# Patient Record
Sex: MALE | Race: WHITE | NOT HISPANIC OR LATINO | Employment: PART TIME | URBAN - METROPOLITAN AREA
[De-identification: names, ages, dates, MRNs, and addresses within clinical notes are randomized per-mention and may not be internally consistent; named-entity substitution may affect disease eponyms.]

---

## 2018-05-03 ENCOUNTER — OFFICE VISIT - HEALTHEAST (OUTPATIENT)
Dept: SURGERY | Facility: CLINIC | Age: 28
End: 2018-05-03

## 2018-05-03 DIAGNOSIS — K80.20 CALCULUS OF GALLBLADDER WITHOUT CHOLECYSTITIS WITHOUT OBSTRUCTION: ICD-10-CM

## 2018-05-03 DIAGNOSIS — E66.01 MORBID OBESITY WITH BMI OF 70 AND OVER, ADULT (H): ICD-10-CM

## 2018-05-03 RX ORDER — OMEGA-3-ACID ETHYL ESTERS 1 G/1
2000 CAPSULE, LIQUID FILLED ORAL DAILY
Status: SHIPPED | COMMUNITY
Start: 2017-09-18

## 2018-05-03 RX ORDER — HYDROCHLOROTHIAZIDE 25 MG/1
25 TABLET ORAL DAILY
Status: SHIPPED | COMMUNITY
Start: 2017-01-05

## 2018-05-03 ASSESSMENT — MIFFLIN-ST. JEOR: SCORE: 3470.1

## 2021-06-01 VITALS — HEIGHT: 72 IN | WEIGHT: 315 LBS | BODY MASS INDEX: 42.66 KG/M2

## 2021-06-17 NOTE — PROGRESS NOTES
HPI: Jason Cantrell is a 27 y.o. male referred to see me by Rika Valdivia MD for right upper quadrant pain.  He notes  a single episode of right upper quadrant pain with associated nausea, vomiting, and diarrhea.  He reports is the first time that this happened, beginning yesterday morning and lasting for approximately 5 hours.  He is been having diarrhea for the preceding 3 days.  He notes having had similar symptoms several years prior, although there was no pain associated with the nausea vomiting diarrhea at that time.  Due to the change in symptoms he presented to the emergency department for evaluation.  There he underwent both imaging and laboratory evaluation which were notable for Gall gallstones, but no evidence of acute cholecystitis.  By the time that his evaluation was complete the pain had subsided and he was feeling well, so he is discharged home with plan for follow-up with surgery.      His medical history is notable for super morbid obesity, BMI of 73.9.  Has not had previous intra-abdominal surgery.  Has had off-and-on nausea, vomiting, and diarrhea for the past several years, but notes the frequency is less than 1 time per year.    Allergies:Review of patient's allergies indicates no known allergies.    Past Medical History:   Diagnosis Date     Bipolar disorder      Hypertension      Morbid obesity        Past Surgical History:   Procedure Laterality Date     HERNIA REPAIR         CURRENT MEDS:    Current Outpatient Prescriptions:      amLODIPine (NORVASC) 5 MG tablet, Take 5 mg by mouth daily., Disp: , Rfl:      cholecalciferol, vitamin D3, (VITAMIN D3) 2,000 unit cap, Take by mouth., Disp: , Rfl:      DULoxetine (CYMBALTA) 60 MG capsule, Take 60 mg by mouth daily., Disp: , Rfl:      hydroCHLOROthiazide (HYDRODIURIL) 25 MG tablet, Take 25 mg by mouth daily., Disp: , Rfl:      MULTIVIT-MINERALS/FERROUS GLUC (CEROVITE ORAL), Take by mouth., Disp: , Rfl:      niacin 500 MG tablet, Take 500  mg by mouth daily with breakfast., Disp: , Rfl:      omega-3 acid ethyl esters (LOVAZA) 1 gram capsule, Take 2,000 mg by mouth daily., Disp: , Rfl:      ziprasidone (GEODON) 80 MG capsule, Take 160 mg by mouth once daily., Disp: , Rfl:     Family History   Problem Relation Age of Onset     Diabetes Mother      Diverticulitis Father      Alcohol abuse Brother         reports that he has been smoking.  He has never used smokeless tobacco. He reports that he drinks alcohol. He reports that he does not use illicit drugs.    Review of Systems:  The 10 point review of systems  is within normal limits except for as mentioned above in the HPI.  General ROS: No complaints or constitutional symptoms  Skin: No complaints or symptoms   Hematologic/Lymphatic: No symptoms or complaints  Psychiatric: No symptoms or complaints  Endocrine: No excessive fatigue, no hypermetabolic symptoms reported  Respiratory ROS: no cough, shortness of breath, or wheezing  Cardiovascular ROS: no chest pain or dyspnea on exertion  Gastrointestinal ROS: As per HPI  Musculoskeletal ROS: no recent injuries reported  Neurological ROS: no focal neurologic defects reported.        BP (!) 171/93 (Patient Site: Right Arm, Patient Position: Sitting, Cuff Size: Adult Large)  Pulse 89  Ht 6' (1.829 m)  Wt (!) 545 lb (247.2 kg)  SpO2 94%  BMI 73.92 kg/m2  Body mass index is 73.92 kg/(m^2).    EXAM:  General : Alert, cooperative, appears stated age   Skin: Skin color, texture, turgor normal, no rashes or lesions   Lymphatic: No obvious adenopathy, no swelling   Eyes: No scleral icterus, pupils equal  HENT: no traumatic injury to the head or face, no gross abnormalities  Lungs: Normal respiratory effort, breath sounds equal bilaterally  Heart: Regular rate and rhythm  Abdomen: Soft, morbidly obese, nontender  Musculoskeletal: No obvious swelling,  Neurologic: Grossly intact      LABS:  Lab Results   Component Value Date    WBC 12.2 (H) 05/02/2018    HGB 14.9  05/02/2018    HCT 43.8 05/02/2018    MCV 83 05/02/2018     05/02/2018     INR/Prothrombin Time    Results from last 7 days  Lab Units 05/02/18  1002   LN-SODIUM mmol/L 139   LN-POTASSIUM mmol/L 3.9   LN-CHLORIDE mmol/L 102   LN-CO2 mmol/L 26   LN-BLOOD UREA NITROGEN mg/dL 14   LN-CREATININE mg/dL 0.80   LN-CALCIUM mg/dL 9.2     Lab Results   Component Value Date    ALT 40 05/02/2018    AST 29 05/02/2018    ALKPHOS 96 05/02/2018    BILITOT 0.4 05/02/2018       IMAGES:   Relevant images were reviewed and discussed with the patient.  Notable findings were the presence of a gallstone near the neck of the gallbladder with a fatty appearing liver.  No overt evidence of cholecystitis, though imaging is suboptimal given the patient's body habitus.    Assessment/Plan:   1. Morbid obesity with BMI of 70 and over, adult    2. Calculus of gallbladder without cholecystitis without obstruction        Jason Cnatrell is a 27 y.o. male with signs and symptoms consistent with a single episode of biliary colic.  I explained to them the formation of gallstones, and how this pain progressed to intermittent obstruction of the gallbladder causing pain.  Typically for a single episode that was classic, such as his, we would recommend surgery for gallbladder removal.  However, with his extreme morbid obesity, I think the potential risks of surgery specifically in his body habitus might outweigh the benefit he would gain from undergoing surgery in the short-term.  I explained how his morbid obesity, body habitus, put in a higher risk for complications such as iatrogenic injury to surrounding structures, as well as anesthetic complication.  As this was his single and first episode, I think would be reasonable to get him plugged in with our bariatric program in an attempt to lose weight prior to proceeding with any surgical intervention.  Discussed the difference in symptoms between biliary colic and acute cholecystitis and that  should acute cholecystitis present, he would then merit emergent surgery.    We briefly discussed his underlying morbid obesity, contributing factors to it, and medical as well as surgical options available for improvement of morbid obesity.  He is interested in losing weight and getting healthier.    Referral was made to the [facility] bariatric program.  We will otherwise defer on surgical intervention for the biliary colic at this time.  Approximately 40 minutes spent in the care of this patient, the majority of which was spent counseling regarding weight loss and tobacco cessation.      Ayo Zavala MD  [phone]  [facility] Department of Surgery

## 2022-12-05 ENCOUNTER — HOSPITAL ENCOUNTER (EMERGENCY)
Facility: HOSPITAL | Age: 32
Discharge: HOME | End: 2022-12-05
Admitting: PHYSICIAN ASSISTANT
Payer: COMMERCIAL

## 2022-12-05 VITALS
RESPIRATION RATE: 12 BRPM | OXYGEN SATURATION: 98 % | HEIGHT: 73 IN | WEIGHT: 315 LBS | BODY MASS INDEX: 41.75 KG/M2 | HEART RATE: 76 BPM | TEMPERATURE: 97.4 F

## 2022-12-05 DIAGNOSIS — M54.9 MUSCULOSKELETAL BACK PAIN: ICD-10-CM

## 2022-12-05 PROCEDURE — 99283 EMERGENCY DEPT VISIT LOW MDM: CPT

## 2022-12-05 RX ORDER — NAPROXEN 250 MG/1
500 TABLET ORAL ONCE
Status: DISCONTINUED | OUTPATIENT
Start: 2022-12-05 | End: 2022-12-05 | Stop reason: HOSPADM

## 2022-12-05 RX ORDER — CYCLOBENZAPRINE HCL 10 MG
10 TABLET ORAL 3 TIMES DAILY PRN
Qty: 18 TABLET | Refills: 0 | Status: SHIPPED | OUTPATIENT
Start: 2022-12-05 | End: 2022-12-11

## 2022-12-05 RX ORDER — CYCLOBENZAPRINE HCL 10 MG
10 TABLET ORAL ONCE
Status: DISCONTINUED | OUTPATIENT
Start: 2022-12-05 | End: 2022-12-05 | Stop reason: HOSPADM

## 2022-12-05 ASSESSMENT — ENCOUNTER SYMPTOMS
ABDOMINAL PAIN: 0
HEMATURIA: 0
BACK PAIN: 1
DIARRHEA: 0
CHILLS: 0
VOMITING: 0
DYSURIA: 0
NAUSEA: 0
SHORTNESS OF BREATH: 0
FEVER: 0
NUMBNESS: 0

## 2022-12-05 NOTE — ED NOTES
"ED Triage Provider Note  [facility] EMERGENCY DEPARTMENT  Encounter Date: Dec 5, 2022    History:  Chief Complaint   Patient presents with     Back Pain     Jason Cantrell is a 32 year old male who presents to the ED with back pain. Woke up with low back pain yesterday, rates it as an 4/10. Tried heating pad which made it better, worse with ambulation. Has not yet taken anything for pain. No fevers, vomiting, chest pain.      Review of Systems:  Positive for low back pain    Exam:  Pulse 76   Temp 97.4  F (36.3  C) (Tympanic)   Resp 12   Ht 1.854 m (6' 1\")   Wt (!) 247.2 kg (545 lb)   SpO2 98%   BMI 71.90 kg/m    General: No acute distress. Appears stated age.   Cardio: normal rate, extremities well perfused  Resp: Normal work of breathing, grossly normal respiratory rate  Neuro: Alert. Facial movement grossly symmetric. Grossly intact strength.       Medical Decision Making:  Patient arriving to the ED with problem as above. A medical screening exam was performed. Initial differential diagnosis includes but not limited to low back strain/sprain, less likely fracture or dislocation.    Imaging, meds orders initiated from Triage. The patient is most appropriate to return to the waiting room.       ANDREA MAE MD  12/5/2022 at 2:33 PM     Andrea Mae MD  12/05/22 1439    "

## 2022-12-05 NOTE — Clinical Note
Jason Cantrell was seen and treated in our emergency department on 12/5/2022.  He may return to work on 12/07/2022.       If you have any questions or concerns, please don't hesitate to call.      Timo Daley PA-C

## 2022-12-05 NOTE — ED PROVIDER NOTES
EMERGENCY DEPARTMENT ENCOUNTER      NAME: Jason Cantrell  AGE: 32 year old male  YOB: 1990  MRN: [id number]  EVALUATION DATE & TIME: No admission date for patient encounter.    PCP: [facility]    ED PROVIDER: Timo Daley PA-C      Chief Complaint   Patient presents with     Back Pain     FINAL IMPRESSION:  1. Musculoskeletal back pain        ED COURSE & MEDICAL DECISION MAKING:    Pertinent Labs & Imaging studies reviewed. (See chart for details)  4:42 PM I met the patient and performed my initial exam. PPE (gloves, glasses, N95 mask) was worn during patient encounters. I discussed a plan for discharge with the patient, and patient is agreeable. We discussed supportive cares at home and reasons for return to the ER, including new or worsening symptoms - all questions and concerns addressed. Discharged patient in stable condition and discussed plan for follow up with PCP.        32 year old male presents to the Emergency Department for evaluation of low back pain.     ED Course as of 12/05/22 1841   Mon Dec 05, 2022   1840 Patient is a 32-year-old male, no significant past medical history, who presents emergency department for evaluation of low back pain.  Patient initially stated that his back pain started yesterday, was approximately an 8 out of 10.  He took some naproxen, however 5 out of 10 after using a heating pad.  Denies any dysuria or hematuria dysuria.  No changes in bowel or bladder.  No saddle anesthesia.  No sign of spinal abscess, fevers, or red flag signs for cauda equina such as saddle anesthesia.  Patient is significantly obese, I think this is likely contributing to his back pain.  At the time my examination he is not complaining of any pain.  X-rays have initially been ordered, however given that his pain is now almost completely resolved without any medications, I think is highly unlikely that he has any fracture or other abnormality.  We will  plan for symptomatic management at home with ibuprofen, Tylenol, as well as muscle relaxers.  Patient is agreeable with this plan.  He has good strength and sensation to upper and lower extremities bilaterally, no other acute abnormalities.  No chest pain or shortness of breath.  No indication for further labs or imaging at this time.  Plan for discharge.       Medical Decision Making    History:    Supplemental history from: N/A    External Record(s) reviewed: Documented in HPI, if applicable.    Work Up:    Chart documentation includes differential considered and any EKGs or imaging interpreted by provider.    In additional to work up documented, I considered the following work up: See chart documentation, if applicable.    External consultation:    Discussion of management with another provider: See chart documentation, if applicable    Complicating factors:    Care impacted by chronic illness: None and Other: Severe Obesity    Care affected by social determinants of health: N/A    Disposition considerations: Discharge. I prescribed additional prescription strength medication(s) as charted. N/A.       At the conclusion of the encounter I discussed the results of all of the tests and the disposition. The questions were answered. The patient or family acknowledged understanding and was agreeable with the care plan.     0 minutes of critical care time     MEDICATIONS GIVEN IN THE EMERGENCY:  Medications   naproxen (NAPROSYN) tablet 500 mg (has no administration in time range)   cyclobenzaprine (FLEXERIL) tablet 10 mg (has no administration in time range)       NEW PRESCRIPTIONS STARTED AT TODAY'S ER VISIT  Discharge Medication List as of 12/5/2022  5:31 PM      START taking these medications    Details   cyclobenzaprine (FLEXERIL) 10 MG tablet Take 1 tablet (10 mg) by mouth 3 times daily as needed for muscle spasms, Disp-18 tablet, R-0, Local Print        "    =================================================================    HPI    Patient information was obtained from: patient    Use of : N/A    Jason Cantrell is a 32 year old male with a pertinent history of bipolar disorder, HT, HDL lipoprotein deficiency, morbid obesity who presents to this ED by private vehicle for evaluation of back pain. Patient reports that he had \"severe\" back pain this morning. He states that, by now, his pain has basically resolved. He states that he still feels a \"slight pinch\" in the midline of his back. Patient denies falls, injury, trauma to his back. Patient denies any urinary symptoms, numbness, tingling, urinary or bowel incontinence, abdominal pain, chest pain, or additional medical concerns or complaints at this time.      Patient sates that he has tried naproxen and heat, with relief.     REVIEW OF SYSTEMS   Review of Systems   Constitutional: Negative for chills and fever.   Respiratory: Negative for shortness of breath.    Cardiovascular: Negative for chest pain.   Gastrointestinal: Negative for abdominal pain, diarrhea, nausea and vomiting.   Genitourinary: Negative.  Negative for dysuria and hematuria.        Negative for urinary or bowel incontinence    Musculoskeletal: Positive for back pain.   Neurological: Negative for numbness.   All other systems reviewed and are negative.       PAST MEDICAL HISTORY:  History reviewed. No pertinent past medical history.    PAST SURGICAL HISTORY:  Past Surgical History:   Procedure Laterality Date     HERNIA REPAIR         CURRENT MEDICATIONS:    cyclobenzaprine (FLEXERIL) 10 MG tablet  amLODIPine (NORVASC) 5 MG tablet  cholecalciferol, vitamin D3, (VITAMIN D3) 2,000 unit cap  DULoxetine (CYMBALTA) 60 MG capsule  hydroCHLOROthiazide (HYDRODIURIL) 25 MG tablet  MULTIVIT-MINERALS/FERROUS GLUC (CEROVITE ORAL)  niacin 500 MG tablet  omega-3 acid ethyl esters (LOVAZA) 1 gram capsule  ziprasidone (GEODON) 80 MG capsule     " "  ALLERGIES:  No Known Allergies    FAMILY HISTORY:  Family History   Problem Relation Age of Onset     Diabetes Mother      Diverticulitis Father      Alcoholism Brother      SOCIAL HISTORY:   Social History     Socioeconomic History     Marital status: Single   Tobacco Use     Smoking status: Every Day     Smokeless tobacco: Never     Tobacco comments:     Vapor   Substance and Sexual Activity     Alcohol use: Yes     Comment: Alcoholic Drinks/day: on occasion     Drug use: No   Social History Narrative    5/3/2016 - Lives in a group home.       VITALS:  Pulse 76   Temp 97.4  F (36.3  C) (Tympanic)   Resp 12   Ht 1.854 m (6' 1\")   Wt (!) 247.2 kg (545 lb)   SpO2 98%   BMI 71.90 kg/m      PHYSICAL EXAM    Physical Exam  Vitals and nursing note reviewed.   Constitutional:       General: He is not in acute distress.     Appearance: Normal appearance. He is normal weight. He is not toxic-appearing or diaphoretic.   HENT:      Head: Normocephalic.      Right Ear: External ear normal.      Left Ear: External ear normal.   Cardiovascular:      Rate and Rhythm: Normal rate and regular rhythm.      Heart sounds: Normal heart sounds. No murmur heard.    No friction rub. No gallop.   Pulmonary:      Effort: Pulmonary effort is normal. No respiratory distress.      Breath sounds: No wheezing.   Abdominal:      General: Abdomen is flat. Bowel sounds are normal. There is no distension.      Palpations: Abdomen is soft.      Tenderness: There is no abdominal tenderness. There is no right CVA tenderness, left CVA tenderness, guarding or rebound.   Musculoskeletal:         General: No swelling or tenderness. Normal range of motion.      Comments: No tenderness, crepitus across the lumbar, thoracic, or cervical spine.   Skin:     General: Skin is warm.   Neurological:      Mental Status: He is alert. Mental status is at baseline.      Sensory: No sensory deficit.      Motor: No weakness.      Comments: Strength and sensation " to upper and lower extremities bilaterally- No saddle anesthesia, no changes in bowel or bladder.         LAB:  All pertinent labs reviewed and interpreted.  Labs Ordered and Resulted from Time of ED Arrival to Time of ED Departure - No data to display    RADIOLOGY:  Reviewed all pertinent imaging. Please see official radiology report.  No orders to display     PROCEDURES:   None    I, Elise Gryler, am serving as a scribe to document services personally performed by Timo Daley PA-C, based on my observation and the provider's statements to me. I, Timo Daley PA-C, attest that Elise Gryler is acting in a scribe capacity, has observed my performance of the services and has documented them in accordance with my direction.    Timo Daley PA-C  Emergency Medicine  [facility] EMERGENCY DEPARTMENT  [facility] [facility] [address]  [phone]  Dept: [phone]       Timo Daley PA-C  12/05/22 1845

## 2022-12-05 NOTE — ED TRIAGE NOTES
Pt developed low back pain yesterday but today it is much worse.  initally it was 8/10 today but now its 5/10 after using a heating pad.  Nodysuria. bp 208/121 in left upper arm.

## 2022-12-05 NOTE — DISCHARGE INSTRUCTIONS
Seen here in the emergency department for evaluation of back pain.  By the time I seen you, your pain had mostly resolved, we are not giving any medications.  Your examination here is very reassuring, I do not think there is any need for imaging at this time.  Recommend ibuprofen and Tylenol at home, will provide you with a work note, as well as some muscle relaxers.    For pain or fever you may use:  -Tylenol 650 mg every 6 hours.  Max 4000 mg in 24 hours  Do not use thismedication with alcohol as it can cause liver problems.  -Ibuprofen 600 mg every 6 hours.  Max 3500 mg in 24 hours  Do not take this medication if you have a history of a GI bleed or have kidney problems.  You may use both of these medications at the same time or you can alternate them every 3 hours.  For example, Tylenol at 6 AM, ibuprofen at 9 AM, Tylenol at noon, etc.

## 2024-12-26 ENCOUNTER — APPOINTMENT (OUTPATIENT)
Dept: RADIOLOGY | Facility: HOSPITAL | Age: 34
End: 2024-12-26
Attending: EMERGENCY MEDICINE
Payer: COMMERCIAL

## 2024-12-26 ENCOUNTER — APPOINTMENT (OUTPATIENT)
Dept: CARDIOLOGY | Facility: HOSPITAL | Age: 34
End: 2024-12-26
Attending: STUDENT IN AN ORGANIZED HEALTH CARE EDUCATION/TRAINING PROGRAM
Payer: COMMERCIAL

## 2024-12-26 ENCOUNTER — APPOINTMENT (OUTPATIENT)
Dept: ULTRASOUND IMAGING | Facility: HOSPITAL | Age: 34
End: 2024-12-26
Attending: EMERGENCY MEDICINE
Payer: COMMERCIAL

## 2024-12-26 ENCOUNTER — HOSPITAL ENCOUNTER (INPATIENT)
Facility: HOSPITAL | Age: 34
End: 2024-12-26
Attending: EMERGENCY MEDICINE
Payer: COMMERCIAL

## 2024-12-26 VITALS
OXYGEN SATURATION: 93 % | SYSTOLIC BLOOD PRESSURE: 125 MMHG | HEART RATE: 73 BPM | HEIGHT: 73 IN | TEMPERATURE: 97.6 F | DIASTOLIC BLOOD PRESSURE: 64 MMHG | RESPIRATION RATE: 20 BRPM | WEIGHT: 315 LBS | BODY MASS INDEX: 41.75 KG/M2

## 2024-12-26 DIAGNOSIS — I50.9 ACUTE CONGESTIVE HEART FAILURE, UNSPECIFIED HEART FAILURE TYPE (H): ICD-10-CM

## 2024-12-26 DIAGNOSIS — R61 DIAPHORESIS: ICD-10-CM

## 2024-12-26 DIAGNOSIS — R06.00 DYSPNEA, UNSPECIFIED TYPE: ICD-10-CM

## 2024-12-26 DIAGNOSIS — I16.1 HYPERTENSIVE EMERGENCY: ICD-10-CM

## 2024-12-26 LAB
ANION GAP SERPL CALCULATED.3IONS-SCNC: 14 MMOL/L (ref 7–15)
BUN SERPL-MCNC: 15.3 MG/DL (ref 6–20)
CALCIUM SERPL-MCNC: 8.8 MG/DL (ref 8.8–10.4)
CHLORIDE SERPL-SCNC: 104 MMOL/L (ref 98–107)
CORTIS SERPL-MCNC: 12.3 UG/DL
CREAT SERPL-MCNC: 1.26 MG/DL (ref 0.67–1.17)
EGFRCR SERPLBLD CKD-EPI 2021: 77 ML/MIN/1.73M2
ERYTHROCYTE [DISTWIDTH] IN BLOOD BY AUTOMATED COUNT: 13.6 % (ref 10–15)
GLUCOSE SERPL-MCNC: 101 MG/DL (ref 70–99)
HCO3 SERPL-SCNC: 22 MMOL/L (ref 22–29)
HCT VFR BLD AUTO: 42.2 % (ref 40–53)
HGB BLD-MCNC: 14.4 G/DL (ref 13.3–17.7)
HOLD SPECIMEN: NORMAL
HOLD SPECIMEN: NORMAL
LVEF ECHO: NORMAL
MCH RBC QN AUTO: 28.1 PG (ref 26.5–33)
MCHC RBC AUTO-ENTMCNC: 34.1 G/DL (ref 31.5–36.5)
MCV RBC AUTO: 82 FL (ref 78–100)
NT-PROBNP SERPL-MCNC: 3433 PG/ML (ref 0–450)
PLATELET # BLD AUTO: 228 10E3/UL (ref 150–450)
POTASSIUM SERPL-SCNC: 3.6 MMOL/L (ref 3.4–5.3)
RBC # BLD AUTO: 5.12 10E6/UL (ref 4.4–5.9)
SODIUM SERPL-SCNC: 140 MMOL/L (ref 135–145)
TROPONIN T SERPL HS-MCNC: 41 NG/L
TROPONIN T SERPL HS-MCNC: 46 NG/L
WBC # BLD AUTO: 10.2 10E3/UL (ref 4–11)

## 2024-12-26 PROCEDURE — 36415 COLL VENOUS BLD VENIPUNCTURE: CPT | Performed by: EMERGENCY MEDICINE

## 2024-12-26 PROCEDURE — 93005 ELECTROCARDIOGRAM TRACING: CPT | Performed by: EMERGENCY MEDICINE

## 2024-12-26 PROCEDURE — 96375 TX/PRO/DX INJ NEW DRUG ADDON: CPT

## 2024-12-26 PROCEDURE — G0378 HOSPITAL OBSERVATION PER HR: HCPCS

## 2024-12-26 PROCEDURE — 99223 1ST HOSP IP/OBS HIGH 75: CPT | Performed by: INTERNAL MEDICINE

## 2024-12-26 PROCEDURE — C8929 TTE W OR WO FOL WCON,DOPPLER: HCPCS

## 2024-12-26 PROCEDURE — 85048 AUTOMATED LEUKOCYTE COUNT: CPT | Performed by: EMERGENCY MEDICINE

## 2024-12-26 PROCEDURE — 96374 THER/PROPH/DIAG INJ IV PUSH: CPT | Mod: 59

## 2024-12-26 PROCEDURE — 80048 BASIC METABOLIC PNL TOTAL CA: CPT | Performed by: EMERGENCY MEDICINE

## 2024-12-26 PROCEDURE — 93306 TTE W/DOPPLER COMPLETE: CPT | Mod: 26 | Performed by: INTERNAL MEDICINE

## 2024-12-26 PROCEDURE — 85027 COMPLETE CBC AUTOMATED: CPT | Performed by: EMERGENCY MEDICINE

## 2024-12-26 PROCEDURE — 250N000013 HC RX MED GY IP 250 OP 250 PS 637: Performed by: INTERNAL MEDICINE

## 2024-12-26 PROCEDURE — 99223 1ST HOSP IP/OBS HIGH 75: CPT | Performed by: STUDENT IN AN ORGANIZED HEALTH CARE EDUCATION/TRAINING PROGRAM

## 2024-12-26 PROCEDURE — 96376 TX/PRO/DX INJ SAME DRUG ADON: CPT

## 2024-12-26 PROCEDURE — 250N000013 HC RX MED GY IP 250 OP 250 PS 637: Performed by: STUDENT IN AN ORGANIZED HEALTH CARE EDUCATION/TRAINING PROGRAM

## 2024-12-26 PROCEDURE — 71046 X-RAY EXAM CHEST 2 VIEWS: CPT

## 2024-12-26 PROCEDURE — 120N000004 HC R&B MS OVERFLOW

## 2024-12-26 PROCEDURE — 250N000011 HC RX IP 250 OP 636: Performed by: STUDENT IN AN ORGANIZED HEALTH CARE EDUCATION/TRAINING PROGRAM

## 2024-12-26 PROCEDURE — 255N000002 HC RX 255 OP 636: Performed by: STUDENT IN AN ORGANIZED HEALTH CARE EDUCATION/TRAINING PROGRAM

## 2024-12-26 PROCEDURE — 82533 TOTAL CORTISOL: CPT | Performed by: STUDENT IN AN ORGANIZED HEALTH CARE EDUCATION/TRAINING PROGRAM

## 2024-12-26 PROCEDURE — 93970 EXTREMITY STUDY: CPT

## 2024-12-26 PROCEDURE — 99285 EMERGENCY DEPT VISIT HI MDM: CPT | Mod: 25

## 2024-12-26 PROCEDURE — 84484 ASSAY OF TROPONIN QUANT: CPT | Performed by: EMERGENCY MEDICINE

## 2024-12-26 PROCEDURE — 82310 ASSAY OF CALCIUM: CPT | Performed by: EMERGENCY MEDICINE

## 2024-12-26 PROCEDURE — 250N000011 HC RX IP 250 OP 636: Performed by: EMERGENCY MEDICINE

## 2024-12-26 PROCEDURE — 83880 ASSAY OF NATRIURETIC PEPTIDE: CPT | Performed by: EMERGENCY MEDICINE

## 2024-12-26 RX ORDER — CALCIUM CARBONATE 500 MG/1
1000 TABLET, CHEWABLE ORAL 4 TIMES DAILY PRN
Status: ACTIVE | OUTPATIENT
Start: 2024-12-26

## 2024-12-26 RX ORDER — DULOXETIN HYDROCHLORIDE 60 MG/1
60 CAPSULE, DELAYED RELEASE ORAL DAILY
Status: DISPENSED | OUTPATIENT
Start: 2024-12-26

## 2024-12-26 RX ORDER — LABETALOL HYDROCHLORIDE 5 MG/ML
40 INJECTION, SOLUTION INTRAVENOUS ONCE
Status: COMPLETED | OUTPATIENT
Start: 2024-12-26 | End: 2024-12-26

## 2024-12-26 RX ORDER — LABETALOL HYDROCHLORIDE 5 MG/ML
40 INJECTION, SOLUTION INTRAVENOUS ONCE
Status: DISCONTINUED | OUTPATIENT
Start: 2024-12-26 | End: 2024-12-26

## 2024-12-26 RX ORDER — ZIPRASIDONE HYDROCHLORIDE 20 MG/1
40 CAPSULE ORAL DAILY
Status: DISPENSED | OUTPATIENT
Start: 2024-12-26

## 2024-12-26 RX ORDER — FUROSEMIDE 10 MG/ML
40 INJECTION INTRAMUSCULAR; INTRAVENOUS EVERY 12 HOURS
Status: DISPENSED | OUTPATIENT
Start: 2024-12-26

## 2024-12-26 RX ORDER — AMOXICILLIN 250 MG
1 CAPSULE ORAL 2 TIMES DAILY PRN
Status: ACTIVE | OUTPATIENT
Start: 2024-12-26

## 2024-12-26 RX ORDER — AMLODIPINE BESYLATE 5 MG/1
5 TABLET ORAL DAILY
Status: ACTIVE | OUTPATIENT
Start: 2024-12-26

## 2024-12-26 RX ORDER — AMOXICILLIN 250 MG
2 CAPSULE ORAL 2 TIMES DAILY PRN
Status: ACTIVE | OUTPATIENT
Start: 2024-12-26

## 2024-12-26 RX ORDER — ENOXAPARIN SODIUM 100 MG/ML
40 INJECTION SUBCUTANEOUS EVERY 12 HOURS
Status: DISPENSED | OUTPATIENT
Start: 2024-12-26

## 2024-12-26 RX ORDER — FUROSEMIDE 10 MG/ML
60 INJECTION INTRAMUSCULAR; INTRAVENOUS ONCE
Status: COMPLETED | OUTPATIENT
Start: 2024-12-26 | End: 2024-12-26

## 2024-12-26 RX ORDER — LIDOCAINE 40 MG/G
CREAM TOPICAL
Status: ACTIVE | OUTPATIENT
Start: 2024-12-26

## 2024-12-26 RX ORDER — ZIPRASIDONE HYDROCHLORIDE 40 MG/1
40 CAPSULE ORAL DAILY
Status: ON HOLD | COMMUNITY
Start: 2024-09-11

## 2024-12-26 RX ORDER — NICOTINE 21 MG/24HR
1 PATCH, TRANSDERMAL 24 HOURS TRANSDERMAL DAILY PRN
Status: ACTIVE | OUTPATIENT
Start: 2024-12-26

## 2024-12-26 RX ORDER — METOPROLOL SUCCINATE 25 MG/1
25 TABLET, EXTENDED RELEASE ORAL DAILY
Status: DISCONTINUED | OUTPATIENT
Start: 2024-12-26 | End: 2024-12-26 | Stop reason: ALTCHOICE

## 2024-12-26 RX ORDER — PROCHLORPERAZINE MALEATE 5 MG/1
10 TABLET ORAL EVERY 6 HOURS PRN
Status: ACTIVE | OUTPATIENT
Start: 2024-12-26

## 2024-12-26 RX ORDER — ONDANSETRON 4 MG/1
4 TABLET, ORALLY DISINTEGRATING ORAL EVERY 6 HOURS PRN
Status: ACTIVE | OUTPATIENT
Start: 2024-12-26

## 2024-12-26 RX ORDER — ACETAMINOPHEN 650 MG/1
650 SUPPOSITORY RECTAL EVERY 4 HOURS PRN
Status: ACTIVE | OUTPATIENT
Start: 2024-12-26

## 2024-12-26 RX ORDER — HYDRALAZINE HYDROCHLORIDE 20 MG/ML
20 INJECTION INTRAMUSCULAR; INTRAVENOUS EVERY 6 HOURS PRN
Status: DISPENSED | OUTPATIENT
Start: 2024-12-26

## 2024-12-26 RX ORDER — CARVEDILOL 3.12 MG/1
6.25 TABLET ORAL 2 TIMES DAILY WITH MEALS
Status: DISPENSED | OUTPATIENT
Start: 2024-12-26

## 2024-12-26 RX ORDER — ONDANSETRON 2 MG/ML
4 INJECTION INTRAMUSCULAR; INTRAVENOUS EVERY 6 HOURS PRN
Status: ACTIVE | OUTPATIENT
Start: 2024-12-26

## 2024-12-26 RX ORDER — LABETALOL HYDROCHLORIDE 5 MG/ML
40 INJECTION, SOLUTION INTRAVENOUS EVERY 4 HOURS PRN
Status: DISPENSED | OUTPATIENT
Start: 2024-12-26

## 2024-12-26 RX ORDER — ACETAMINOPHEN 325 MG/1
650 TABLET ORAL EVERY 4 HOURS PRN
Status: ACTIVE | OUTPATIENT
Start: 2024-12-26

## 2024-12-26 RX ADMIN — FUROSEMIDE 40 MG: 10 INJECTION, SOLUTION INTRAMUSCULAR; INTRAVENOUS at 09:50

## 2024-12-26 RX ADMIN — ZIPRASIDONE HCL 40 MG: 20 CAPSULE ORAL at 09:00

## 2024-12-26 RX ADMIN — LABETALOL HYDROCHLORIDE 40 MG: 5 INJECTION, SOLUTION INTRAVENOUS at 07:01

## 2024-12-26 RX ADMIN — ENOXAPARIN SODIUM 40 MG: 40 INJECTION SUBCUTANEOUS at 21:17

## 2024-12-26 RX ADMIN — DULOXETINE HYDROCHLORIDE 60 MG: 60 CAPSULE, DELAYED RELEASE ORAL at 09:00

## 2024-12-26 RX ADMIN — LABETALOL HYDROCHLORIDE 40 MG: 5 INJECTION, SOLUTION INTRAVENOUS at 04:20

## 2024-12-26 RX ADMIN — FUROSEMIDE 60 MG: 10 INJECTION, SOLUTION INTRAMUSCULAR; INTRAVENOUS at 07:26

## 2024-12-26 RX ADMIN — LABETALOL HYDROCHLORIDE 40 MG: 5 INJECTION, SOLUTION INTRAVENOUS at 18:45

## 2024-12-26 RX ADMIN — LABETALOL HYDROCHLORIDE 40 MG: 5 INJECTION, SOLUTION INTRAVENOUS at 14:00

## 2024-12-26 RX ADMIN — FUROSEMIDE 40 MG: 10 INJECTION, SOLUTION INTRAMUSCULAR; INTRAVENOUS at 21:17

## 2024-12-26 RX ADMIN — CARVEDILOL 6.25 MG: 3.12 TABLET, FILM COATED ORAL at 21:16

## 2024-12-26 RX ADMIN — CARVEDILOL 6.25 MG: 3.12 TABLET, FILM COATED ORAL at 11:52

## 2024-12-26 RX ADMIN — ENOXAPARIN SODIUM 40 MG: 40 INJECTION SUBCUTANEOUS at 09:49

## 2024-12-26 RX ADMIN — METOPROLOL SUCCINATE 25 MG: 25 TABLET, EXTENDED RELEASE ORAL at 09:48

## 2024-12-26 RX ADMIN — HYDRALAZINE HYDROCHLORIDE 20 MG: 20 INJECTION INTRAMUSCULAR; INTRAVENOUS at 13:14

## 2024-12-26 RX ADMIN — PERFLUTREN 5 ML: 6.52 INJECTION, SUSPENSION INTRAVENOUS at 12:55

## 2024-12-26 ASSESSMENT — ACTIVITIES OF DAILY LIVING (ADL)
ADLS_ACUITY_SCORE: 49
ADLS_ACUITY_SCORE: 55
ADLS_ACUITY_SCORE: 41
ADLS_ACUITY_SCORE: 49
ADLS_ACUITY_SCORE: 41
ADLS_ACUITY_SCORE: 41
ADLS_ACUITY_SCORE: 49
ADLS_ACUITY_SCORE: 41
ADLS_ACUITY_SCORE: 41
ADLS_ACUITY_SCORE: 49
ADLS_ACUITY_SCORE: 41
ADLS_ACUITY_SCORE: 49
ADLS_ACUITY_SCORE: 49
ADLS_ACUITY_SCORE: 41

## 2024-12-26 ASSESSMENT — COLUMBIA-SUICIDE SEVERITY RATING SCALE - C-SSRS
2. HAVE YOU ACTUALLY HAD ANY THOUGHTS OF KILLING YOURSELF IN THE PAST MONTH?: NO
1. IN THE PAST MONTH, HAVE YOU WISHED YOU WERE DEAD OR WISHED YOU COULD GO TO SLEEP AND NOT WAKE UP?: NO
6. HAVE YOU EVER DONE ANYTHING, STARTED TO DO ANYTHING, OR PREPARED TO DO ANYTHING TO END YOUR LIFE?: NO

## 2024-12-26 NOTE — H&P
[facility]    History and Physical - Hospitalist Service       Date of Admission:  12/26/2024    Assessment & Plan    Jason Cantrell is a 34 year old male admitted on 12/26/2024. He presented with hypertensive emergency.  Past medical history significant for morbid obesity, hypertension and history of bipolar 1 disorder.    Hypertensive emergency   -Presented with shortness of breath and sweating at night  -Patient with known history of hypertension with noncompliance to his medication.  -Upon arrival blood pressure was 284/165.  He was given IV labetalol and decreased down to 216/127  -Continue hydralazine and labetalol as needed for elevated blood pressure  -Goal of blood pressure in the next 24 hours is around 200/110  -PTA amlodipine 5 mg oral daily  -Start carvedilol 6.25 mg oral twice daily    New onset heart failure  Dyspnea  -Patient reports having shortness of breath with mild exertion  -BNP significantly elevated above 3000.  -Diuresis with Lasix 40 mg IV every 12 hours  -Appreciate cardiology consult  -Echocardiogram  -Daily weight, strict input and output monitoring  -Monitor BMP    Morbid obesity   BMI:70  Check venous blood gas  Needs sleep study to be done as an outpatient    History of bipolar 1 disorder  -PTA Cymbalta and Geodon    Nicotine abuse  -As needed nicotine patch  - Smoking cessation counseling      Diet: Fluid restriction 2000 ML FLUID (and additional linked orders)  Combination Diet Low Saturated Fat Na <2400mg Diet, No Caffeine Diet  DVT Prophylaxis: Enoxaparin (Lovenox) SQ  [facility] Catheter: Not present  Lines: None     Cardiac Monitoring: ACTIVE order. Indication: Acute decompensated heart failure (48 hours)  Code Status: Full Codefull code     Clinically Significant Risk Factors Present on Admission                   # Hypertension: Noted on problem list           # Severe Obesity: Estimated body mass index is 70.72 kg/m  as calculated from the  "following:    Height as of this encounter: 1.854 m (6' 1\").    Weight as of this encounter: 243.1 kg (536 lb).              Disposition Plan     Medically Ready for Discharge: Anticipated in 2-4 Days           Eloise Cohn MD  Hospitalist Service  [facility]  Securely message with [employer] (more info)  Text page via [employer] Paging/Directory     ______________________________________________________________________    Chief Complaint   Elevated blood pressure, dyspnea/few days duration    History is obtained from the patient    History of Present Illness   Jason Cantrell is a 34 year old male who presented with above complaint. Past medical history significant for morbid obesity, hypertension and history of bipolar 1 disorder.  Upon arrival to the ER patient's blood pressure was 284/165 which came down to 216-127 after IV labetalol.  Patient denies having chest pain or palpitation currently.  He also denies having headache.  Reports having an episode of diarrhea today.  BNP is significantly elevated more than 3000.  Troponin elevated at 43 followed by 41.  Patient will be admitted for management of hypertensive emergency and new onset heart failure.      Past Medical History    No past medical history on file.    Past Surgical History   Past Surgical History:   Procedure Laterality Date    HERNIA REPAIR         Prior to Admission Medications   Prior to Admission Medications   Prescriptions Last Dose Informant Patient Reported? Taking?   DULoxetine (CYMBALTA) 60 MG capsule 12/23/2024 Morning Self Yes Yes   Sig: [DULOXETINE (CYMBALTA) 60 MG CAPSULE] Take 60 mg by mouth daily.   MULTIVIT-MINERALS/FERROUS GLUC (CEROVITE ORAL) 12/23/2024 Morning Self Yes Yes   Sig: [MULTIVIT-MINERALS/FERROUS GLUC (CEROVITE ORAL)] Take by mouth.   amLODIPine (NORVASC) 5 MG tablet Past Week Morning Self Yes Yes   Sig: [AMLODIPINE (NORVASC) 5 MG TABLET] Take 5 mg by mouth daily.   cholecalciferol, vitamin " D3, (VITAMIN D3) 2,000 unit cap 12/23/2024 Morning Self Yes Yes   Sig: [CHOLECALCIFEROL, VITAMIN D3, (VITAMIN D3) 2,000 UNIT CAP] Take by mouth.   hydroCHLOROthiazide (HYDRODIURIL) 25 MG tablet 12/23/2024 Morning Self Yes Yes   Sig: [HYDROCHLOROTHIAZIDE (HYDRODIURIL) 25 MG TABLET] Take 25 mg by mouth daily.   niacin 500 MG tablet 12/23/2024 Morning Self Yes Yes   Sig: [NIACIN 500 MG TABLET] Take 500 mg by mouth daily with breakfast.   omega-3 acid ethyl esters (LOVAZA) 1 gram capsule 12/23/2024 Morning Self Yes Yes   Sig: [OMEGA-3 ACID ETHYL ESTERS (LOVAZA) 1 GRAM CAPSULE] Take 2,000 mg by mouth daily.   ziprasidone (GEODON) 40 MG capsule 12/23/2024 Morning Self Yes Yes   Sig: Take 40 mg by mouth daily.      Facility-Administered Medications: None           Physical Exam   Vital Signs: Temp: 98.7  F (37.1  C) Temp src: Oral BP: (!) 220/125 Pulse: 79   Resp: 28 SpO2: 96 % O2 Device: None (Room air)    Weight: 536 lbs 0 oz    General Appearance: No distress noted  Respiratory: Distant heart and lung sounds due to body habitus  Cardiovascular: S1 and S2 are heard at the distant  GI: Obese, soft abdomen, no tenderness, normoactive bowel sound  Skin: Intact and warm, dry skin on bilateral lower extremity with callus on his foot       Medical Decision Making       75 MINUTES SPENT BY ME on the date of service doing chart review, history, exam, documentation & further activities per the note.      Data

## 2024-12-26 NOTE — CONSULTS
"  HEART CARE CONSULTATON NOTE        Assessment/Recommendations   Assessment: 34 year old male with acute CHF    Plan:  Acute CHF, unknown systolic or diastolic  Reviewed diagnosis, treatment, causes (DANIKA, obesity, HTN)      -Currently Lasix 40mg IV BID.  Monitor weight (536), creatinine (1.26), I/O       -Rest echocardiogram pending      -Advised out-patient evaluation for DANIKA      -Diet, exercise, weight loss      -Tobacco cessation      -As below for HTN    Elevated troponin  No signs or symptoms of ACS, suspect due to CHF and HTN      -As above    HTN  Quite high      -Continue Norvasc 5mg      -Change Toprol 25mg to Coreg 6.25mg BID         Clinically Significant Risk Factors Present on Admission                     # Hypertension: Noted on problem list             # Severe Obesity: Estimated body mass index is 70.72 kg/m  as calculated from the following:    Height as of this encounter: 1.854 m (6' 1\").    Weight as of this encounter: 243.1 kg (536 lb).               Combined acute    Other fluid overload         History of Present Illness/Subjective    Indication for Consult:  I was asked to see Jason Cantrell by Dr Cohn for dyspnea     HPI: Jason Cantrell is a 34 year old male with a history of morbid obesity (weight 536), bipolar, untreated HTN, tobacco who was admitted 12/26/2024 for dyspnea.  ECG no acute changes, hs troponin 46, 41, BNP 3433, CXR CHF.    He reports he had been in usual state of health, minimally active without chest pain or dyspnea.  Yesterday he abruptly became very short of breath with activity, no orthopnea or PND, no chest pain or palpitations, no fever or cough, no LE edema.         Physical Examination  Past Cardiac History   VITALS: BP (!) 220/125   Pulse 79   Temp 98.3  F (36.8  C) (Oral)   Resp 28   Ht 1.854 m (6' 1\")   Wt (!) 243.1 kg (536 lb)   SpO2 96%   BMI 70.72 kg/m    BMI: Body mass index is 70.72 kg/m .  Wt Readings from Last 3 Encounters: " "  12/26/24 (!) 243.1 kg (536 lb)   12/05/22 (!) 247.2 kg (545 lb)   05/03/18 (!) 247.2 kg (545 lb)     No intake or output data in the 24 hours ending 12/26/24 1046  General Appearance:   no distress, exam compromised by obesity   ENT/Mouth: membranes moist, no oral lesions or bleeding gums.      EYES:  no scleral icterus, normal conjunctivae   Neck: no carotid bruits or thyromegaly   Chest/Lungs:   lungs are clear to auscultation, no rales or wheezing,  sternal scar, equal chest wall expansion    Cardiovascular:   Regular. Normal first and second heart sounds with no murmurs, rubs, or gallops; the carotid, radial and posterior tibial pulses are intact, Jugular venous pressure normal, trace edema bilaterally    Abdomen:  no organomegaly, masses, bruits, or tenderness; bowel sounds are present   Extremities: no cyanosis or clubbing   Skin: no xanthelasma, warm.    Neurologic: normal  bilateral, no tremors          None    Most Recent Echocardiogram: None    Most Recent Stress Test: None    Most Recent Angiogram: None    ECG (reviewed by myself): 12/26/2024 NSR 86 bpm          Lab Results    Chemistry/lipid CBC Cardiac Enzymes/BNP/TSH/INR   No results for input(s): \"CHOL\", \"HDL\", \"LDL\", \"TRIG\", \"CHOLHDLRATIO\" in the last 27975 hours.  No results for input(s): \"LDL\" in the last 09305 hours.  Recent Labs   Lab Test 12/26/24 0413      POTASSIUM 3.6   CHLORIDE 104   CO2 22   *   BUN 15.3   CR 1.26*   GFRESTIMATED 77   DANIEL 8.8     Recent Labs   Lab Test 12/26/24 0413 05/02/18  1002   CR 1.26* 0.80     No results for input(s): \"A1C\" in the last 86413 hours.       Recent Labs   Lab Test 12/26/24 0413   WBC 10.2   HGB 14.4   HCT 42.2   MCV 82        Recent Labs   Lab Test 12/26/24 0413 05/02/18  1002   HGB 14.4 14.9    No results for input(s): \"TROPONINI\" in the last 09733 hours.  Recent Labs   Lab Test 12/26/24  0413   NTBNPI 3,433*     No results for input(s): \"TSH\" in the last 60970 hours.  No " "results for input(s): \"INR\" in the last 65829 hours.     Medical History  Family History Social History   HTN  Obesity  Bipolar  Family History   Problem Relation Age of Onset    Diabetes Mother     Diverticulitis Father     Alcoholism Brother         Social History     Socioeconomic History    Marital status: Single     Spouse name: Not on file    Number of children: Not on file    Years of education: Not on file    Highest education level: Not on file   Occupational History    Not on file   Tobacco Use    Smoking status: Every Day    Smokeless tobacco: Never    Tobacco comments:     Vapor   Substance and Sexual Activity    Alcohol use: Yes     Comment: Alcoholic Drinks/day: on occasion    Drug use: No    Sexual activity: Not on file   Other Topics Concern    Not on file   Social History Narrative    5/3/2016 - Lives in a group home.     Social Drivers of Health     Financial Resource Strain: Not on file   Food Insecurity: Not on file   Transportation Needs: Not on file   Physical Activity: Not on file   Stress: Not on file   Social Connections: Not on file   Interpersonal Safety: Not on file   Housing Stability: Not on file         Cardiac Medications  Allergies   Prior to Admit: Noravsc 5mg    Current: Norvasc 5mg, Lasix 40mg IV BID, Toprol 25mg  No Known Allergies      Ivelisse Kern MD  Noninvasive Cardiologist   [facility]  "

## 2024-12-26 NOTE — MEDICATION SCRIBE - ADMISSION MEDICATION HISTORY
Medication Scribe Admission Medication History    Admission medication history is complete. The information provided in this note is only as accurate as the sources available at the time of the update.    Information Source(s): Patient via in-person    Pertinent Information: Patient states that he is taking Amlodipine, Hydrochlorothiazide, Niacin, Omega 3, Multivitamin and Cholecalciferol daily, but there is no fill history shows within last year. Patient confirmed taking these medications and states that he is getting his medications from mail order pharmacy except Duloxetine and Ziprasidone.  Patient states that he have not been taking Amlodipine for over 6 days, and the rest of his other medication listed below have not taking for over two days due to not filled on time.    Changes made to PTA medication list:  Added: None  Deleted: None  Changed: Ziprasidone 80 mg 2 qd to 40 mg daily (per patient and fill history)    Allergies reviewed with patient and updates made in EHR: yes    Medication History Completed By: Armando Burns 12/26/2024 5:43 AM    PTA Med List   Medication Sig Last Dose/Taking    amLODIPine (NORVASC) 5 MG tablet [AMLODIPINE (NORVASC) 5 MG TABLET] Take 5 mg by mouth daily. Past Week Morning    cholecalciferol, vitamin D3, (VITAMIN D3) 2,000 unit cap [CHOLECALCIFEROL, VITAMIN D3, (VITAMIN D3) 2,000 UNIT CAP] Take by mouth. 12/23/2024 Morning    DULoxetine (CYMBALTA) 60 MG capsule [DULOXETINE (CYMBALTA) 60 MG CAPSULE] Take 60 mg by mouth daily. 12/23/2024 Morning    hydroCHLOROthiazide (HYDRODIURIL) 25 MG tablet [HYDROCHLOROTHIAZIDE (HYDRODIURIL) 25 MG TABLET] Take 25 mg by mouth daily. 12/23/2024 Morning    MULTIVIT-MINERALS/FERROUS GLUC (CEROVITE ORAL) [MULTIVIT-MINERALS/FERROUS GLUC (CEROVITE ORAL)] Take by mouth. 12/23/2024 Morning    niacin 500 MG tablet [NIACIN 500 MG TABLET] Take 500 mg by mouth daily with breakfast. 12/23/2024 Morning    omega-3 acid ethyl esters (LOVAZA) 1 gram  capsule [OMEGA-3 ACID ETHYL ESTERS (LOVAZA) 1 GRAM CAPSULE] Take 2,000 mg by mouth daily. 12/23/2024 Morning    ziprasidone (GEODON) 40 MG capsule Take 40 mg by mouth daily. 12/23/2024 Morning

## 2024-12-26 NOTE — ED PROVIDER NOTES
EMERGENCY DEPARTMENT ENCOUNTER      NAME: Jason Cantrell  AGE: 34 year old male  YOB: 1990  EVALUATION DATE & TIME: 12/26/2024  3:37 AM    ED PROVIDER: Sisi You MD    Chief Complaint   Patient presents with    Hypertension       FINAL IMPRESSION  1. Hypertensive emergency    2. Dyspnea, unspecified type    3. Diaphoresis    4. Acute congestive heart failure, unspecified heart failure type (H)        MEDICAL DECISION MAKING   Jason Cantrell is a 34 year old male who presents for evaluation of shortness of breath and sweating.  Records reviewed.  The patient has a history of bipolar disorder, hypertriglyceridemia, hypertension, and morbid obesity.  He had been on lisinopril-chlorothiazide for his blood pressure but unfortunately when last seen by PCP in 2020.  Today, he presents for evaluation of dyspnea, diaphoresis, and elevated blood pressure.  Patient reports that his symptoms have been bothering him for the last 2 days, essentially only at night when he started to experience shortness of breath and sweating.  He has not had any associated chest pain, cough, fever, abdominal pain, nausea, vomiting, or leg swelling.  He does admit that he lost the bottle of his blood pressure medication so has not been taking these but has been taking all of his other medications as prescribed.  He denies recent travel or sick contacts.  He does smoke cigarettes.  Vitals on arrival in triage notable for significantly elevated blood pressure at 284/165.  Only slightly improved at time of my initial evaluation at which time was 269/157.    I considered a broad differential including but not limited to accelerated hypertension, hypertensive emergency, medication noncompliance/tolerance, ACS/ischemia, unstable angina, CHF, PE, viral illness, pneumonia, pulmonary edema, pleural effusion, anemia, derangement, habitus, sleep apnea, obesity hypoventilation syndrome.  Discussed options for workup and management  with the patient.  We have agreed on plan to start with labs, EKG, and CT of the chest.  Will also give a dose of labetalol in hopes of bringing blood pressure down at least towards lower 200s.    EKG showed no evidence of acute ischemia but did reveal voltage criteria for LVH.  Unfortunately, patient was too large for CT scanner.  With this, I did order ultrasound of bilateral lower extremities and a chest x-ray.     ED Course as of 12/26/24 0740   Thu Dec 26, 2024   0533 Troponin T, High Sensitivity(!): 46  Above age-adjusted cut off, likely at least in part related to demand in the setting of severe hypertension.  Will plan to repeat at 2 hours.  Patient is currently chest pain-free and EKG shows no evidence of acute ischemia.   0533 Basic metabolic panel(!)  BMP notable for creatinine of 1.26, concerning for acute kidney injury, though have no recent for comparison.  No other electrolyte derangement or acidosis.   0533 N-Terminal Pro BNP Inpatient(!): 3,433  BNP elevated at 3433, concerning for CHF contributing to symptoms.   0534 CBC (+ platelets, no diff)  CBC reassuring. No evidence of leukocytosis to suggest systemic infectious/inflammatory process. No acute anemia. PLTs wnl.    0738 US Lower Extremity Venous Duplex Bilateral  No evidence of acute DVT.   0739 XR Chest 2 Views  I independently reviewed x-ray which revealed pulmonary vascular congestion and mild edema but no focal infiltrates.  I do have concern that CHF may be contributing to symptoms.  Will plan to start diuresis with Lasix.     I rechecked the patient multiple times.  Blood pressure did come down slightly after dose of labetalol 40 mg IV.  He was given a second dose with additional improvement to 216/127.  I suspect patient has been living at higher blood pressure readings for quite some time would like to avoid going this too quickly.  I reviewed results of labs and imaging with patient and his parents.  We discussed options for ongoing  workup and management and the recommended admission for diuresis and echocardiogram and also to get restarted on medications.  Patient and family would feel more comfortable with this option.  He may ultimately benefit from nitro but for now, will start with Lasix.  I discussed case with hospitalist who agreed to facilitate admission.  Repeat troponin is pending.      Considerations in Medical Decision Making  History:  Obtained supplemental history: Supplemental history obtained?: Family Member/Significant Other  Reviewed external records: External records reviewed?: Documented in chart  Care impacted by chronic illness: Hypertension and Mental Health    Work Up:  In additional to work up documented, I considered the following work up: Documented in chart, if applicable.  Chart documentation includes differential considered and any EKGs or imaging independently interpreted by provider, where specified.    External consultation:  Discussion of management with another provider: Hospitalist    Disposition considerations: Admit.    MIPS Documentation: Not Applicable       Critical care: 60 minutes excluding separately billable procedures.  Includes bedside management, time reviewing test results, review of records, discussing the case with staff, documenting the medical record and time spent with family members (or surrogate decision makers) discussing specific treatment issues.      ED COURSE  3:43 AM I met with the patient to obtain patient history and performed a physical exam. Discussed plan for ED work up including potential diagnostic studies and interventions.  4:48 AM I was notified that the patient is too big for the scanner.  7:15 AM I rechecked and updated patient.  7:24 AM I spoke with Dr. Cohn, hospitalist who accepts the patient for admission.      MEDICATIONS GIVEN IN THE ED  Medications   labetalol (NORMODYNE/TRANDATE) injection 40 mg (40 mg Intravenous $Given 12/26/24 1793)   labetalol  "(NORMODYNE/TRANDATE) injection 40 mg (40 mg Intravenous $Given 12/26/24 0701)   furosemide (LASIX) injection 60 mg (60 mg Intravenous $Given 12/26/24 0726)       NEW PRESCRIPTIONS STARTED AT TODAY'S VISIT  New Prescriptions    No medications on file          =================================================================    HPI:    Use of : N/A     Jason Cantrell is a 34 year old male who presents with shortness of breath and diaphoresis. Patient reports 2 nights of shortness of breath and sweats. It happened last night, but got better and was feeling good all throughout the day. It came back tonight. Patient states he gets shortness of breath all the time, especially when moving around. He has been taking all of his medication, except for his blood pressure medication because he couldn't find the bottle. It has been 8 days without taking his blood pressure medications. Patient confirms smoking. Patient denies chest pain, cough, fever, abdominal pain, nausea, leg swelling, and sick contact.      RELEVANT HISTORY, MEDICATIONS, & ALLERGIES   Past medical history, surgical history, family history, medications, and allergies reviewed and pertinent noted in HPI.    REVIEW OF SYSTEMS:  A complete review of systems was performed with pertinent positives and negatives noted in the HPI.     PHYSICAL EXAM:    Vitals: BP (!) 181/105   Pulse 73   Temp 98.3  F (36.8  C) (Oral)   Resp 19   Ht 1.854 m (6' 1\")   Wt (!) 243.1 kg (536 lb)   SpO2 95%   BMI 70.72 kg/m     General: Alert and interactive, comfortable appearing.  HENT: Atraumatic. Full AROM of neck. MMM.  Cardiovascular: Regular rate and rhythm.   Chest/Pulmonary: Normal work of breathing.  Lung sounds diminished bilaterally.  No audible crackles or wheezing.  Abdomen: Soft, morbidly obese. Nontender without guarding or rebound.  Extremities: Normal AROM of all major joints.  Skin: Warm and dry. Normal skin color.  Diaphoresis.  Neuro: Speech " clear. CNs grossly intact. Moves all extremities spontaneously.   Psych: Normal affect/mood, cooperative, memory appropriate.      LAB  Labs Ordered and Resulted from Time of ED Arrival to Time of ED Departure   TROPONIN T, HIGH SENSITIVITY - Abnormal       Result Value    Troponin T, High Sensitivity 46 (*)    NT PROBNP INPATIENT - Abnormal    N terminal Pro BNP Inpatient 3,433 (*)    BASIC METABOLIC PANEL - Abnormal    Sodium 140      Potassium 3.6      Chloride 104      Carbon Dioxide (CO2) 22      Anion Gap 14      Urea Nitrogen 15.3      Creatinine 1.26 (*)     GFR Estimate 77      Calcium 8.8      Glucose 101 (*)    TROPONIN T, HIGH SENSITIVITY - Abnormal    Troponin T, High Sensitivity 41 (*)    CBC WITH PLATELETS - Normal    WBC Count 10.2      RBC Count 5.12      Hemoglobin 14.4      Hematocrit 42.2      MCV 82      MCH 28.1      MCHC 34.1      RDW 13.6      Platelet Count 228         RADIOLOGY  XR Chest 2 Views   Final Result   IMPRESSION: Moderate cardiomegaly with mild pulmonary vascular congestion. No confluent airspace opacity, pleural effusion or pneumothorax.      US Lower Extremity Venous Duplex Bilateral   Final Result   IMPRESSION: No evidence of thrombus in the major veins of bilateral lower extremities.                           EKG  Performed at: 12/26/2024 0400  Impression: Normal sinus rhythm. Possible left atrial enlargement. Prolonged QT.  No acute ischemic changes.  Rate: 86  Rhythm: normal sinus  QRS Interval: 110  QTc Interval: 483  Comparison: when compared with ECG of 05/02/2018 1041, possible left atrial enlargement and prolonged QT is now present.    All laboratory and imaging results and EKG's were personally reviewed and interpreted by myself prior to disposition decision.         I, Johnny Diane, am serving as a scribe to document services personally performed by Dr. Sisi You based on my observation and the provider's statements to me. I, Sisi You MD attest that Johnny  Benedicto is acting in a scribe capacity, has observed my performance of the services and has documented them in accordance with my direction.    Sisi You M.D.  Emergency Medicine  [facility] EMERGENCY DEPARTMENT  [address] [place] [id number]  [phone]  Dept: [phone]     Sisi You MD  12/26/24 0749

## 2024-12-26 NOTE — ED TRIAGE NOTES
Patient reports sob starting last night. Called EMS and bp pressure was high was told to come to ED. Reports not taking his bp medication for about 8 days.       Triage Assessment (Adult)       Row Name 12/26/24 0334          Triage Assessment    Airway WDL WDL        Respiratory WDL    Respiratory WDL WDL

## 2024-12-27 ENCOUNTER — APPOINTMENT (OUTPATIENT)
Dept: OCCUPATIONAL THERAPY | Facility: HOSPITAL | Age: 34
End: 2024-12-27
Attending: STUDENT IN AN ORGANIZED HEALTH CARE EDUCATION/TRAINING PROGRAM
Payer: COMMERCIAL

## 2024-12-27 LAB
ALBUMIN MFR UR ELPH: 41.7 MG/DL
ALBUMIN SERPL BCG-MCNC: 3.9 G/DL (ref 3.5–5.2)
ALBUMIN UR-MCNC: 30 MG/DL
ALP SERPL-CCNC: 77 U/L (ref 40–150)
ALT SERPL W P-5'-P-CCNC: 26 U/L (ref 0–70)
AMPHETAMINES UR QL SCN: NORMAL
ANION GAP SERPL CALCULATED.3IONS-SCNC: 14 MMOL/L (ref 7–15)
APPEARANCE UR: CLEAR
AST SERPL W P-5'-P-CCNC: 33 U/L (ref 0–45)
ATRIAL RATE - MUSE: 86 BPM
BARBITURATES UR QL SCN: NORMAL
BASE EXCESS BLDV CALC-SCNC: 2.6 MMOL/L (ref -3–3)
BENZODIAZ UR QL SCN: NORMAL
BILIRUB DIRECT SERPL-MCNC: <0.2 MG/DL (ref 0–0.3)
BILIRUB SERPL-MCNC: 0.9 MG/DL
BILIRUB UR QL STRIP: NEGATIVE
BUN SERPL-MCNC: 18.3 MG/DL (ref 6–20)
BZE UR QL SCN: NORMAL
CALCIUM SERPL-MCNC: 9 MG/DL (ref 8.8–10.4)
CANNABINOIDS UR QL SCN: NORMAL
CHLORIDE SERPL-SCNC: 101 MMOL/L (ref 98–107)
COLOR UR AUTO: YELLOW
CREAT SERPL-MCNC: 1.27 MG/DL (ref 0.67–1.17)
CREAT UR-MCNC: 193.7 MG/DL
DIASTOLIC BLOOD PRESSURE - MUSE: NORMAL MMHG
EGFRCR SERPLBLD CKD-EPI 2021: 76 ML/MIN/1.73M2
ERYTHROCYTE [DISTWIDTH] IN BLOOD BY AUTOMATED COUNT: 14.1 % (ref 10–15)
EST. AVERAGE GLUCOSE BLD GHB EST-MCNC: 97 MG/DL
FENTANYL UR QL: NORMAL
GLUCOSE SERPL-MCNC: 93 MG/DL (ref 70–99)
GLUCOSE UR STRIP-MCNC: NEGATIVE MG/DL
HBA1C MFR BLD: 5 %
HCO3 BLDV-SCNC: 27 MMOL/L (ref 21–28)
HCO3 SERPL-SCNC: 24 MMOL/L (ref 22–29)
HCT VFR BLD AUTO: 42.7 % (ref 40–53)
HGB BLD-MCNC: 14.3 G/DL (ref 13.3–17.7)
HGB UR QL STRIP: NEGATIVE
HYALINE CASTS: 2 /LPF
INTERPRETATION ECG - MUSE: NORMAL
KETONES UR STRIP-MCNC: NEGATIVE MG/DL
LEUKOCYTE ESTERASE UR QL STRIP: ABNORMAL
MCH RBC QN AUTO: 27.9 PG (ref 26.5–33)
MCHC RBC AUTO-ENTMCNC: 33.5 G/DL (ref 31.5–36.5)
MCV RBC AUTO: 83 FL (ref 78–100)
MUCOUS THREADS #/AREA URNS LPF: PRESENT /LPF
NITRATE UR QL: NEGATIVE
O2/TOTAL GAS SETTING VFR VENT: 21 %
OPIATES UR QL SCN: NORMAL
OXYHGB MFR BLDV: 94 % (ref 70–75)
P AXIS - MUSE: 54 DEGREES
PCO2 BLDV: 38 MM HG (ref 40–50)
PCP QUAL URINE (ROCHE): NORMAL
PH BLDV: 7.45 [PH] (ref 7.32–7.43)
PH UR STRIP: 6 [PH] (ref 5–7)
PLATELET # BLD AUTO: 237 10E3/UL (ref 150–450)
PO2 BLDV: 70 MM HG (ref 25–47)
POTASSIUM SERPL-SCNC: 3.4 MMOL/L (ref 3.4–5.3)
PR INTERVAL - MUSE: 156 MS
PROT SERPL-MCNC: 6.9 G/DL (ref 6.4–8.3)
PROT/CREAT 24H UR: 0.22 MG/MG CR (ref 0–0.2)
QRS DURATION - MUSE: 110 MS
QT - MUSE: 404 MS
QTC - MUSE: 483 MS
R AXIS - MUSE: 47 DEGREES
RBC # BLD AUTO: 5.12 10E6/UL (ref 4.4–5.9)
RBC URINE: 3 /HPF
SAO2 % BLDV: 95.2 % (ref 70–75)
SODIUM SERPL-SCNC: 139 MMOL/L (ref 135–145)
SP GR UR STRIP: 1.02 (ref 1–1.03)
SQUAMOUS EPITHELIAL: <1 /HPF
SYSTOLIC BLOOD PRESSURE - MUSE: NORMAL MMHG
T AXIS - MUSE: 31 DEGREES
TSH SERPL DL<=0.005 MIU/L-ACNC: 2.27 UIU/ML (ref 0.3–4.2)
UROBILINOGEN UR STRIP-MCNC: <2 MG/DL
VENTRICULAR RATE- MUSE: 86 BPM
WBC # BLD AUTO: 11.2 10E3/UL (ref 4–11)
WBC CLUMPS #/AREA URNS HPF: PRESENT /HPF
WBC URINE: 88 /HPF

## 2024-12-27 PROCEDURE — 82040 ASSAY OF SERUM ALBUMIN: CPT | Performed by: INTERNAL MEDICINE

## 2024-12-27 PROCEDURE — 99232 SBSQ HOSP IP/OBS MODERATE 35: CPT | Performed by: INTERNAL MEDICINE

## 2024-12-27 PROCEDURE — 250N000011 HC RX IP 250 OP 636: Performed by: INTERNAL MEDICINE

## 2024-12-27 PROCEDURE — 36415 COLL VENOUS BLD VENIPUNCTURE: CPT | Performed by: STUDENT IN AN ORGANIZED HEALTH CARE EDUCATION/TRAINING PROGRAM

## 2024-12-27 PROCEDURE — 84156 ASSAY OF PROTEIN URINE: CPT | Performed by: INTERNAL MEDICINE

## 2024-12-27 PROCEDURE — 250N000013 HC RX MED GY IP 250 OP 250 PS 637: Performed by: INTERNAL MEDICINE

## 2024-12-27 PROCEDURE — 82248 BILIRUBIN DIRECT: CPT | Performed by: INTERNAL MEDICINE

## 2024-12-27 PROCEDURE — 97165 OT EVAL LOW COMPLEX 30 MIN: CPT | Mod: GO

## 2024-12-27 PROCEDURE — 84443 ASSAY THYROID STIM HORMONE: CPT | Performed by: INTERNAL MEDICINE

## 2024-12-27 PROCEDURE — 80307 DRUG TEST PRSMV CHEM ANLYZR: CPT | Performed by: INTERNAL MEDICINE

## 2024-12-27 PROCEDURE — 81003 URINALYSIS AUTO W/O SCOPE: CPT | Performed by: INTERNAL MEDICINE

## 2024-12-27 PROCEDURE — 97535 SELF CARE MNGMENT TRAINING: CPT | Mod: GO

## 2024-12-27 PROCEDURE — 99233 SBSQ HOSP IP/OBS HIGH 50: CPT | Performed by: INTERNAL MEDICINE

## 2024-12-27 PROCEDURE — 120N000004 HC R&B MS OVERFLOW

## 2024-12-27 PROCEDURE — 80053 COMPREHEN METABOLIC PANEL: CPT | Performed by: STUDENT IN AN ORGANIZED HEALTH CARE EDUCATION/TRAINING PROGRAM

## 2024-12-27 PROCEDURE — 250N000011 HC RX IP 250 OP 636: Performed by: STUDENT IN AN ORGANIZED HEALTH CARE EDUCATION/TRAINING PROGRAM

## 2024-12-27 PROCEDURE — 82805 BLOOD GASES W/O2 SATURATION: CPT | Performed by: STUDENT IN AN ORGANIZED HEALTH CARE EDUCATION/TRAINING PROGRAM

## 2024-12-27 PROCEDURE — 85018 HEMOGLOBIN: CPT | Performed by: STUDENT IN AN ORGANIZED HEALTH CARE EDUCATION/TRAINING PROGRAM

## 2024-12-27 PROCEDURE — 99207 PR NO BILLABLE SERVICE THIS VISIT: CPT | Performed by: INTERNAL MEDICINE

## 2024-12-27 PROCEDURE — 83036 HEMOGLOBIN GLYCOSYLATED A1C: CPT | Performed by: INTERNAL MEDICINE

## 2024-12-27 PROCEDURE — 87086 URINE CULTURE/COLONY COUNT: CPT | Performed by: INTERNAL MEDICINE

## 2024-12-27 PROCEDURE — 80048 BASIC METABOLIC PNL TOTAL CA: CPT | Performed by: STUDENT IN AN ORGANIZED HEALTH CARE EDUCATION/TRAINING PROGRAM

## 2024-12-27 PROCEDURE — 250N000013 HC RX MED GY IP 250 OP 250 PS 637: Performed by: STUDENT IN AN ORGANIZED HEALTH CARE EDUCATION/TRAINING PROGRAM

## 2024-12-27 RX ORDER — LOSARTAN POTASSIUM 25 MG/1
25 TABLET ORAL DAILY
Status: DISCONTINUED | OUTPATIENT
Start: 2024-12-27 | End: 2024-12-28

## 2024-12-27 RX ORDER — CARVEDILOL 12.5 MG/1
12.5 TABLET ORAL 2 TIMES DAILY WITH MEALS
Status: DISCONTINUED | OUTPATIENT
Start: 2024-12-27 | End: 2024-12-28

## 2024-12-27 RX ORDER — CARVEDILOL 3.12 MG/1
6.25 TABLET ORAL ONCE
Status: COMPLETED | OUTPATIENT
Start: 2024-12-27 | End: 2024-12-27

## 2024-12-27 RX ORDER — AMLODIPINE BESYLATE 5 MG/1
5 TABLET ORAL ONCE
Status: COMPLETED | OUTPATIENT
Start: 2024-12-27 | End: 2024-12-27

## 2024-12-27 RX ORDER — AMLODIPINE BESYLATE 5 MG/1
10 TABLET ORAL DAILY
Status: DISCONTINUED | OUTPATIENT
Start: 2024-12-28 | End: 2024-12-31 | Stop reason: HOSPADM

## 2024-12-27 RX ADMIN — CARVEDILOL 6.25 MG: 3.12 TABLET, FILM COATED ORAL at 10:39

## 2024-12-27 RX ADMIN — CARVEDILOL 12.5 MG: 12.5 TABLET, FILM COATED ORAL at 18:52

## 2024-12-27 RX ADMIN — FUROSEMIDE 40 MG: 10 INJECTION, SOLUTION INTRAMUSCULAR; INTRAVENOUS at 20:17

## 2024-12-27 RX ADMIN — AMLODIPINE BESYLATE 5 MG: 5 TABLET ORAL at 08:09

## 2024-12-27 RX ADMIN — ZIPRASIDONE HCL 40 MG: 20 CAPSULE ORAL at 08:10

## 2024-12-27 RX ADMIN — DULOXETINE HYDROCHLORIDE 60 MG: 60 CAPSULE, DELAYED RELEASE ORAL at 08:10

## 2024-12-27 RX ADMIN — FUROSEMIDE 40 MG: 10 INJECTION, SOLUTION INTRAMUSCULAR; INTRAVENOUS at 08:09

## 2024-12-27 RX ADMIN — ENOXAPARIN SODIUM 40 MG: 40 INJECTION SUBCUTANEOUS at 20:18

## 2024-12-27 RX ADMIN — ENOXAPARIN SODIUM 40 MG: 40 INJECTION SUBCUTANEOUS at 08:09

## 2024-12-27 RX ADMIN — AMLODIPINE BESYLATE 5 MG: 5 TABLET ORAL at 10:39

## 2024-12-27 RX ADMIN — CARVEDILOL 6.25 MG: 3.12 TABLET, FILM COATED ORAL at 08:09

## 2024-12-27 RX ADMIN — LOSARTAN POTASSIUM 25 MG: 25 TABLET, FILM COATED ORAL at 12:05

## 2024-12-27 ASSESSMENT — ACTIVITIES OF DAILY LIVING (ADL)
ADLS_ACUITY_SCORE: 32
ADLS_ACUITY_SCORE: 31
ADLS_ACUITY_SCORE: 32
ADLS_ACUITY_SCORE: 31
ADLS_ACUITY_SCORE: 31
ADLS_ACUITY_SCORE: 32
ADLS_ACUITY_SCORE: 31
ADLS_ACUITY_SCORE: 32
ADLS_ACUITY_SCORE: 31
ADLS_ACUITY_SCORE: 32
ADLS_ACUITY_SCORE: 32
ADLS_ACUITY_SCORE: 31
ADLS_ACUITY_SCORE: 32
ADLS_ACUITY_SCORE: 32
ADLS_ACUITY_SCORE: 31
ADLS_ACUITY_SCORE: 32
ADLS_ACUITY_SCORE: 31

## 2024-12-27 NOTE — PROGRESS NOTES
12/27/24 1310   Appointment Info   Signing Clinician's Name / Credentials (OT) Taina Patel OTR/L   Living Environment   People in Home sibling(s)  (brother)   Current Living Arrangements house   Home Accessibility no concerns   Transportation Anticipated family or friend will provide   Self-Care   Usual Activity Tolerance moderate   Current Activity Tolerance fair   Regular Exercise No   Equipment Currently Used at Home none   Fall history within last six months no   Activity/Exercise/Self-Care Comment pt previously I with all ADL's   Instrumental Activities of Daily Living (IADL)   IADL Comments I with IADL's   General Information   Onset of Illness/Injury or Date of Surgery 12/26/24   Referring Physician Dr Tucker   Patient/Family Therapy Goal Statement (OT) go home soon, feel better   Additional Occupational Profile Info/Pertinent History of Current Problem 34 year old male admitted on 12/26/2024. He presented with hypertensive emergency.  Past medical history significant for morbid obesity, hypertension and history of bipolar 1 disorder.   Cognitive Status Examination   Orientation Status orientation to person, place and time   Affect/Mental Status (Cognitive) WNL   Follows Commands WNL   Pain Assessment   Patient Currently in Pain No   Range of Motion Comprehensive   General Range of Motion no range of motion deficits identified   Strength Comprehensive (MMT)   General Manual Muscle Testing (MMT) Assessment no strength deficits identified   Bed Mobility   Bed Mobility supine-sit;sit-supine   Supine-Sit [place] (Bed Mobility) supervision   Sit-Supine [place] (Bed Mobility) supervision   Transfers   Transfers sit-stand transfer;toilet transfer   Sit-Stand Transfer   Sit-Stand [place] (Transfers) independent   Toilet Transfer   Type (Toilet Transfer) sit-stand;stand-sit   [place] Level (Toilet Transfer) independent   Activities of Daily Living   BADL Assessment/Intervention toileting    Toileting   Assistive Devices (Toileting) grab bar, toilet   [place] Level (Toileting) independent;adjust/manage clothing;toileting skills   Clinical Impression   Criteria for Skilled Therapeutic Interventions Met (OT) Yes, treatment indicated   OT Diagnosis decreased ADL endurance   Influenced by the following impairments SOB   OT Problem List-Impairments impacting ADL activity tolerance impaired   Assessment of Occupational Performance 1-3 Performance Deficits   Identified Performance Deficits mobility, trsfs   Planned Therapy Interventions (OT) home program guidelines;progressive activity/exercise;risk factor education   Clinical Decision Making Complexity (OT) problem focused assessment/low complexity   Risk & Benefits of therapy have been explained evaluation/treatment results reviewed;patient;mother   OT Total Evaluation Time   OT Eval, Low Complexity Minutes (62119) 13   OT Goals   Therapy Frequency (OT) One time eval and treatment   OT Predicted Duration/Target Date for Goal Attainment 12/27/24   OT: Understanding of cardiac education to maximize quality of life, condition management, and health outcomes Patient;Verbalize;Goal Met   OT: Perform aerobic activity with stable cardiovascular response continuous;5 minutes;ambulation;Goal Met   Self-Care/Home Management   Self-Care/Home Mgmt/ADL, Compensatory, Meal Prep Minutes (47106) 14   Treatment Detail/Skilled Intervention Eval completed, treatment initiated.  Pt able to complete trsfs and ADL's during session with SBA to I'emce.  Pt demo only slight SOB with activity but able to tolerate well, O2 sats good during activity as well. Educated about activity tolerance and monitoring response to activity.  Increased time spent during session with CHF education, presented booklet to patient and patient's mother.  Instructed pt on medication compliance, exercise program and diet restrictions.  Pt verbalized an understanding, also discussed [facility]  referral and possibility of starting cardiac rehab in the future.  Answered all questions.  Pt returned to supine, left with call light.   OT Discharge Planning   OT Plan no further OT needs at this time   OT Discharge Recommendation (DC Rec) home with assist   OT Rationale for DC Rec Pt okay to discharge home when medically appropriate, no further OT needs at this time   OT Brief overview of current status SBA to I with ADL's and trsf, mobility   Total Session Time   Timed Code Treatment Minutes 14   Total Session Time (sum of timed and untimed services) 27

## 2024-12-27 NOTE — PLAN OF CARE
Problem: Adult Inpatient Plan of Care  Goal: Absence of Hospital-Acquired Illness or Injury  12/26/2024 1936 by Radha Diallo RN  Outcome: Progressing  12/26/2024 1936 by Radha Diallo RN  Outcome: Progressing  Intervention: Identify and Manage Fall Risk  Recent Flowsheet Documentation  Taken 12/26/2024 1539 by Radha Diallo RN  Safety Promotion/Fall Prevention:   activity supervised   clutter free environment maintained  Taken 12/26/2024 1200 by Radha Diallo RN  Safety Promotion/Fall Prevention:   activity supervised   clutter free environment maintained  Intervention: Prevent Skin Injury  Recent Flowsheet Documentation  Taken 12/26/2024 1539 by Radha Diallo RN  Body Position: position changed independently  Taken 12/26/2024 1200 by Radha Diallo RN  Body Position: position changed independently  Goal: Optimal Comfort and Wellbeing  12/26/2024 1936 by Radha Diallo RN  Outcome: Progressing  12/26/2024 1936 by Radha Diallo RN  Outcome: Progressing     Problem: Comorbidity Management  Goal: Blood Pressure in Desired Range  Outcome: Progressing  Intervention: Maintain Blood Pressure Management  Recent Flowsheet Documentation  Taken 12/26/2024 1539 by Radha Diallo RN  Medication Review/Management: medications reviewed  Taken 12/26/2024 1200 by Radha Diallo RN  Medication Review/Management: medications reviewed   Goal Outcome Evaluation:       Patient alert and oriented. Denied having any pain. He gets dyspnea on exertion. Lung sounds diminished. Able to maintain O2 sats above 90%. Ambulates SBA. This afternoon he took a shower. Good intake and output this shift. Blood pressure continues to be hypertensive. Administered scheduled carvedilol, PRN hydralazine and labetalol. BP responded the best to labetalol, went down to 173/80 then it started to climb back up. Most recent one was 220/104 administered another dose of PRN labetalol.  Other vital signs stable. Normal sinus rhythm on tele.

## 2024-12-27 NOTE — PROGRESS NOTES
"CLINICAL NUTRITION SERVICES - BRIEF NOTE     Nutrition Prescription    RECOMMENDATIONS FOR MDs/PROVIDERS TO ORDER:      Malnutrition Status:    Not noted    Recommendations already ordered by Registered Dietitian (RD):  None at this time    Future/Additional Recommendations:  Follow for LOS     REASON FOR ASSESSMENT  Jason Cantrell is a/an 34 year old male assessed by the dietitian for Admission Nutrition Risk Screen for positive weight loss. Weight hx reviewed, no recent wt hx but overall wt stable x 6+ years.     ANTHROPOMETRICS  Height: 185.4 cm (6' 1\")  Most Recent Weight: (!) 248.7 kg (548 lb 3.2 oz)    IBW: 83.6 kg  BMI: Obesity Grade III BMI >40  Weight History:   Wt Readings from Last 20 Encounters:   12/27/24 (!) 248.7 kg (548 lb 3.2 oz)   12/05/22 (!) 247.2 kg (545 lb)   05/03/18 (!) 247.2 kg (545 lb)     INTERVENTIONS  Follow for LOS      "

## 2024-12-27 NOTE — PROGRESS NOTES
[facility]    Medicine Progress Note - Hospitalist Service    Date of Admission:  12/26/2024    Assessment & Plan     Jason Cantrell is a 34 year old male admitted on 12/26/2024. He presented with hypertensive emergency.  Past medical history significant for morbid obesity, hypertension and history of bipolar 1 disorder.     #Hypertensive emergency   -Presented with shortness of breath and sweating at night  -Patient with known history of hypertension with noncompliance to his medication.  -Upon arrival blood pressure was 284/165.  He was given IV labetalol and decreased down to 216/127  -Lasix 40mg IV q12  -increase amlodipine to 10mg every day  -increase coreg to 12.5mg BID  -start Losartan 25mg every day  -check UDS, Uprot/Cr, TSH  -Cardiology assistance appreciated     #HFpEF with ADHF  #Dyspnea  -LVEF 55-60%, very difficult TTE evaluation  -BNP significantly elevated above 3000.  -Diuresis with Lasix 40 mg IV every 12 hours  -Daily weight, strict input and output monitoring  -Cardiology assistance appreciated    #PARK  -labs a.m. on IV Lasix  -strict I/O and daily weights  -UA, Uprot/Cr    #History of bipolar 1 disorder  -PTA Cymbalta and Geodon     #Nicotine use disorder  -As needed nicotine patch  - Smoking cessation counseling    #Morbid obesity  #Probable DANIKA  -BMI:70  -outpatient sleep study advised  -recommend bariatric clinic evaluation as outpatient        Diet: Fluid restriction 2000 ML FLUID (and additional linked orders)  Combination Diet Low Saturated Fat Na <2400mg Diet, No Caffeine Diet    DVT Prophylaxis: Enoxaparin (Lovenox) SQ  [facility] Catheter: Not present  Lines: None     Cardiac Monitoring: ACTIVE order. Indication: Acute decompensated heart failure (48 hours)  Code Status: Full Code      Clinically Significant Risk Factors                   # Hypertension: Noted on problem list  # Acute heart failure with preserved ejection fraction: heart failure noted on  "problem list, last echo with EF >50%, and receiving IV diuretics           # Severe Obesity: Estimated body mass index is 72.33 kg/m  as calculated from the following:    Height as of this encounter: 1.854 m (6' 1\").    Weight as of this encounter: 248.7 kg (548 lb 3.2 oz)., PRESENT ON ADMISSION            Social Drivers of Health    Tobacco Use: High Risk (3/5/2024)    Received from [facility] [facility]    Patient History     Smoking Tobacco Use: Light Smoker     Smokeless Tobacco Use: Former          Disposition Plan     Medically Ready for Discharge: Anticipated in 2-4 Days             Emil Tucker DO, DO  Hospitalist Service  [facility]  Securely message with [employer] (more info)  Text page via [employer] Paging/Directory   ______________________________________________________________________    Interval History   Afebrile. Events overnight noted.    Physical Exam   Vital Signs: Temp: 97.7  F (36.5  C) Temp src: Oral BP: (!) 182/97 Pulse: 72   Resp: 20 SpO2: 97 % O2 Device: None (Room air)    Weight: 548 lbs 3.2 oz    General appearance - alert, and in no distress  Eyes - pupils equal and reactive, extraocular eye movements intact, sclera anicteric  Lungs - decreased bases, non labored  Heart - rrr. BLE edema, morbidly obese  Abdomen - soft, nontender, nondistended,  BS+, morbidly obese  Neurological - alert, oriented, normal speech, no focal findings or movement disorder noted  Skin - no wounds, c/c/p    Lab/imaging reviewed    Case d/w Dr. Marina  "

## 2024-12-27 NOTE — PROGRESS NOTES
Cardiology Progress Note    Assessment/Plan:  1.  Acute HFpEF, likely due to severe hypertension.  Echocardiogram suggests normal overall LV function with severe LVH although regional wall motion analysis could not be done due to poor image quality.  Has diuresed over 2 L since admission.  Continue IV furosemide.  Will increase carvedilol as ordered.  Add losartan 25 mg daily but will need to monitor renal function.  Alternatively could use Hytrin or doxazosin if renal function worsens.  2.  Hypertension, likely primary but possibly aggravated by DANIKA.  Would advise outpatient sleep evaluation.  3.  Morbid obesity  4.  Nicotine abuse    Primary cardiologist: Dr. Ivelisse Kern    Subjective:  Patient reports feeling much better.  Currently denying any shortness of breath, orthopnea or PND.    Current Facility-Administered Medications   Medication Dose Route Frequency Provider Last Rate Last Admin    [START ON 12/28/2024] amLODIPine (NORVASC) tablet 10 mg  10 mg Oral Daily Emil Tucker DO        carvedilol (COREG) tablet 12.5 mg  12.5 mg Oral BID w/meals Emil Tucker DO        DULoxetine (CYMBALTA) DR capsule 60 mg  60 mg Oral Daily Eloise Cohn MD   60 mg at 12/27/24 0810    enoxaparin ANTICOAGULANT (LOVENOX) injection 40 mg  40 mg Subcutaneous Q12H Eloise Cohn MD   40 mg at 12/27/24 0809    furosemide (LASIX) injection 40 mg  40 mg Intravenous Q12H Emil Tucker DO   40 mg at 12/27/24 0809    sodium chloride (PF) 0.9% PF flush 3 mL  3 mL Intracatheter Q8H Eloise Cohn MD   3 mL at 12/27/24 0810    ziprasidone (GEODON) capsule 40 mg  40 mg Oral Daily Eloise Cohn MD   40 mg at 12/27/24 0810         Objective:   Vital signs in last 24 hours:  Temp:  [97.6  F (36.4  C)-97.9  F (36.6  C)] 97.7  F (36.5  C)  Pulse:  [69-75] 72  Resp:  [18-21] 20  BP: (125-226)/() 177/93  SpO2:  [93 %-97 %] 97 %  Weight:        Review of  "Systems:  Negative    Physical Exam:  General appearance: alert, cooperative, no distress   Head: Normocephalic, without obvious abnormality, atraumatic  Neck: no JVD   Lungs: clear to auscultation bilaterally   Heart: Regular rate and rhythm.  S1, S2 normal.  No murmur or gallop  Extremities: 1+ ankle edema    Cardiographics (personally reviewed):   Telemetry demonstrates sinus rhythm    Imaging (personally reviewed):   No new imaging    Lab Results (personally reviewed):     No results for input(s): \"CHOL\", \"HDL\", \"LDL\", \"TRIG\", \"CHOLHDLRATIO\" in the last 28621 hours.  No results for input(s): \"LDL\" in the last 40557 hours.  Recent Labs   Lab Test 12/27/24 0449      POTASSIUM 3.4   CHLORIDE 101   CO2 24   GLC 93   BUN 18.3   CR 1.27*   GFRESTIMATED 76   DANIEL 9.0     Recent Labs   Lab Test 12/27/24 0449 12/26/24 0413 05/02/18  1002   CR 1.27* 1.26* 0.80     No results for input(s): \"A1C\" in the last 14791 hours.       Recent Labs   Lab Test 12/27/24 0449   WBC 11.2*   HGB 14.3   HCT 42.7   MCV 83        Recent Labs   Lab Test 12/27/24 0449 12/26/24 0413 05/02/18  1002   HGB 14.3 14.4 14.9    No results for input(s): \"TROPONINI\" in the last 46739 hours.  Recent Labs   Lab Test 12/26/24 0413   NTBNPI 3,433*     Recent Labs   Lab Test 12/27/24 0449   TSH 2.27     No results for input(s): \"INR\" in the last 69881 hours.       Allison Marina MD        Losartan  "

## 2024-12-27 NOTE — PLAN OF CARE
Goal Outcome Evaluation:    BP remains elevated. Amlodipine and carvedilol increased and losartan added. Patient sleeping on and off throughout shift. Up independently. Reports loose stools. Denied pain and shortness of breath.     Need urine sample. Patient aware.

## 2024-12-27 NOTE — PLAN OF CARE
Problem: Hypertension Acute  Goal: Blood Pressure Within Desired Range  Outcome: Progressing  Intervention: Normalize Blood Pressure  Recent Flowsheet Documentation  Taken 12/27/2024 0400 by Piedad Diaz, RN  Medication Review/Management: medications reviewed  Taken 12/27/2024 0000 by Piedad Diaz, RN  Medication Review/Management: medications reviewed  Taken 12/26/2024 2000 by Piedad Diaz, RN  Medication Review/Management: medications reviewed     Goal Outcome Evaluation:  Pt denies pain. 2000 FR maintained. NSR. O2 sats in the low 90s on RA. Pt ambulating independently in room. Showered x3 this shift. A/O. VSS. Will continue to monitor and notify MD of any changes.

## 2024-12-28 LAB
ALBUMIN SERPL BCG-MCNC: 4 G/DL (ref 3.5–5.2)
ALP SERPL-CCNC: 84 U/L (ref 40–150)
ALT SERPL W P-5'-P-CCNC: 34 U/L (ref 0–70)
ANION GAP SERPL CALCULATED.3IONS-SCNC: 15 MMOL/L (ref 7–15)
AST SERPL W P-5'-P-CCNC: 36 U/L (ref 0–45)
BACTERIA UR CULT: NORMAL
BASOPHILS # BLD AUTO: 0.1 10E3/UL (ref 0–0.2)
BASOPHILS NFR BLD AUTO: 1 %
BILIRUB SERPL-MCNC: 1 MG/DL
BUN SERPL-MCNC: 19.8 MG/DL (ref 6–20)
CALCIUM SERPL-MCNC: 9.4 MG/DL (ref 8.8–10.4)
CHLORIDE SERPL-SCNC: 101 MMOL/L (ref 98–107)
CREAT SERPL-MCNC: 1.19 MG/DL (ref 0.67–1.17)
EGFRCR SERPLBLD CKD-EPI 2021: 82 ML/MIN/1.73M2
EOSINOPHIL # BLD AUTO: 0.2 10E3/UL (ref 0–0.7)
EOSINOPHIL NFR BLD AUTO: 2 %
ERYTHROCYTE [DISTWIDTH] IN BLOOD BY AUTOMATED COUNT: 14.1 % (ref 10–15)
GLUCOSE SERPL-MCNC: 90 MG/DL (ref 70–99)
HCO3 SERPL-SCNC: 23 MMOL/L (ref 22–29)
HCT VFR BLD AUTO: 45.1 % (ref 40–53)
HGB BLD-MCNC: 15.2 G/DL (ref 13.3–17.7)
IMM GRANULOCYTES # BLD: 0.1 10E3/UL
IMM GRANULOCYTES NFR BLD: 1 %
LYMPHOCYTES # BLD AUTO: 2.8 10E3/UL (ref 0.8–5.3)
LYMPHOCYTES NFR BLD AUTO: 25 %
MAGNESIUM SERPL-MCNC: 1.7 MG/DL (ref 1.7–2.3)
MCH RBC QN AUTO: 28.3 PG (ref 26.5–33)
MCHC RBC AUTO-ENTMCNC: 33.7 G/DL (ref 31.5–36.5)
MCV RBC AUTO: 84 FL (ref 78–100)
MONOCYTES # BLD AUTO: 1 10E3/UL (ref 0–1.3)
MONOCYTES NFR BLD AUTO: 9 %
NEUTROPHILS # BLD AUTO: 6.9 10E3/UL (ref 1.6–8.3)
NEUTROPHILS NFR BLD AUTO: 63 %
NRBC # BLD AUTO: 0 10E3/UL
NRBC BLD AUTO-RTO: 0 /100
PLATELET # BLD AUTO: 270 10E3/UL (ref 150–450)
POTASSIUM SERPL-SCNC: 3.9 MMOL/L (ref 3.4–5.3)
PROT SERPL-MCNC: 7.3 G/DL (ref 6.4–8.3)
RBC # BLD AUTO: 5.38 10E6/UL (ref 4.4–5.9)
SODIUM SERPL-SCNC: 139 MMOL/L (ref 135–145)
WBC # BLD AUTO: 11 10E3/UL (ref 4–11)

## 2024-12-28 PROCEDURE — 80053 COMPREHEN METABOLIC PANEL: CPT | Performed by: INTERNAL MEDICINE

## 2024-12-28 PROCEDURE — 250N000013 HC RX MED GY IP 250 OP 250 PS 637: Performed by: INTERNAL MEDICINE

## 2024-12-28 PROCEDURE — 120N000004 HC R&B MS OVERFLOW

## 2024-12-28 PROCEDURE — 99232 SBSQ HOSP IP/OBS MODERATE 35: CPT | Performed by: INTERNAL MEDICINE

## 2024-12-28 PROCEDURE — 83735 ASSAY OF MAGNESIUM: CPT | Performed by: INTERNAL MEDICINE

## 2024-12-28 PROCEDURE — 250N000011 HC RX IP 250 OP 636: Performed by: INTERNAL MEDICINE

## 2024-12-28 PROCEDURE — 36415 COLL VENOUS BLD VENIPUNCTURE: CPT | Performed by: INTERNAL MEDICINE

## 2024-12-28 PROCEDURE — 250N000013 HC RX MED GY IP 250 OP 250 PS 637: Performed by: STUDENT IN AN ORGANIZED HEALTH CARE EDUCATION/TRAINING PROGRAM

## 2024-12-28 PROCEDURE — 85004 AUTOMATED DIFF WBC COUNT: CPT | Performed by: INTERNAL MEDICINE

## 2024-12-28 PROCEDURE — 85018 HEMOGLOBIN: CPT | Performed by: INTERNAL MEDICINE

## 2024-12-28 PROCEDURE — 250N000011 HC RX IP 250 OP 636: Performed by: STUDENT IN AN ORGANIZED HEALTH CARE EDUCATION/TRAINING PROGRAM

## 2024-12-28 RX ORDER — LOSARTAN POTASSIUM 50 MG/1
50 TABLET ORAL DAILY
Status: DISCONTINUED | OUTPATIENT
Start: 2024-12-29 | End: 2024-12-28

## 2024-12-28 RX ORDER — LOSARTAN POTASSIUM 50 MG/1
50 TABLET ORAL 2 TIMES DAILY
Status: DISCONTINUED | OUTPATIENT
Start: 2024-12-29 | End: 2024-12-29

## 2024-12-28 RX ORDER — LOSARTAN POTASSIUM 25 MG/1
25 TABLET ORAL ONCE
Status: COMPLETED | OUTPATIENT
Start: 2024-12-28 | End: 2024-12-28

## 2024-12-28 RX ORDER — CARVEDILOL 12.5 MG/1
25 TABLET ORAL 2 TIMES DAILY WITH MEALS
Status: DISCONTINUED | OUTPATIENT
Start: 2024-12-28 | End: 2024-12-28

## 2024-12-28 RX ORDER — CARVEDILOL 12.5 MG/1
12.5 TABLET ORAL 2 TIMES DAILY WITH MEALS
Status: DISCONTINUED | OUTPATIENT
Start: 2024-12-28 | End: 2024-12-30

## 2024-12-28 RX ORDER — CEFTRIAXONE 2 G/1
2 INJECTION, POWDER, FOR SOLUTION INTRAMUSCULAR; INTRAVENOUS EVERY 24 HOURS
Status: DISCONTINUED | OUTPATIENT
Start: 2024-12-28 | End: 2024-12-29

## 2024-12-28 RX ORDER — SPIRONOLACTONE 25 MG/1
50 TABLET ORAL DAILY
Status: DISCONTINUED | OUTPATIENT
Start: 2024-12-28 | End: 2024-12-31 | Stop reason: HOSPADM

## 2024-12-28 RX ORDER — MAGNESIUM OXIDE 400 MG/1
400 TABLET ORAL 2 TIMES DAILY
Status: DISCONTINUED | OUTPATIENT
Start: 2024-12-28 | End: 2024-12-31 | Stop reason: HOSPADM

## 2024-12-28 RX ADMIN — LOSARTAN POTASSIUM 25 MG: 25 TABLET, FILM COATED ORAL at 08:03

## 2024-12-28 RX ADMIN — CEFTRIAXONE SODIUM 2 G: 2 INJECTION, POWDER, FOR SOLUTION INTRAMUSCULAR; INTRAVENOUS at 09:44

## 2024-12-28 RX ADMIN — CARVEDILOL 12.5 MG: 12.5 TABLET, FILM COATED ORAL at 07:55

## 2024-12-28 RX ADMIN — AMLODIPINE BESYLATE 10 MG: 5 TABLET ORAL at 07:54

## 2024-12-28 RX ADMIN — LOSARTAN POTASSIUM 25 MG: 25 TABLET, FILM COATED ORAL at 09:44

## 2024-12-28 RX ADMIN — ENOXAPARIN SODIUM 40 MG: 40 INJECTION SUBCUTANEOUS at 07:55

## 2024-12-28 RX ADMIN — ENOXAPARIN SODIUM 40 MG: 40 INJECTION SUBCUTANEOUS at 20:46

## 2024-12-28 RX ADMIN — MAGNESIUM OXIDE TAB 400 MG (241.3 MG ELEMENTAL MG) 400 MG: 400 (241.3 MG) TAB at 09:44

## 2024-12-28 RX ADMIN — CARVEDILOL 12.5 MG: 12.5 TABLET, FILM COATED ORAL at 17:05

## 2024-12-28 RX ADMIN — ZIPRASIDONE HCL 40 MG: 20 CAPSULE ORAL at 07:55

## 2024-12-28 RX ADMIN — DULOXETINE HYDROCHLORIDE 60 MG: 60 CAPSULE, DELAYED RELEASE ORAL at 07:55

## 2024-12-28 RX ADMIN — FUROSEMIDE 40 MG: 10 INJECTION, SOLUTION INTRAMUSCULAR; INTRAVENOUS at 08:03

## 2024-12-28 RX ADMIN — FUROSEMIDE 40 MG: 10 INJECTION, SOLUTION INTRAMUSCULAR; INTRAVENOUS at 20:46

## 2024-12-28 RX ADMIN — SPIRONOLACTONE 50 MG: 25 TABLET, FILM COATED ORAL at 10:33

## 2024-12-28 RX ADMIN — HYDRALAZINE HYDROCHLORIDE 20 MG: 20 INJECTION INTRAMUSCULAR; INTRAVENOUS at 19:04

## 2024-12-28 RX ADMIN — MAGNESIUM OXIDE TAB 400 MG (241.3 MG ELEMENTAL MG) 400 MG: 400 (241.3 MG) TAB at 20:46

## 2024-12-28 ASSESSMENT — ACTIVITIES OF DAILY LIVING (ADL)
ADLS_ACUITY_SCORE: 31
ADLS_ACUITY_SCORE: 35
ADLS_ACUITY_SCORE: 31
ADLS_ACUITY_SCORE: 35
ADLS_ACUITY_SCORE: 31

## 2024-12-28 NOTE — PROGRESS NOTES
[facility]    Medicine Progress Note - Hospitalist Service    Date of Admission:  12/26/2024    Assessment & Plan   Jason Cantrell is a 34 year old male admitted on 12/26/2024. He presented with hypertensive emergency.  Past medical history significant for morbid obesity, hypertension and history of bipolar 1 disorder.     #Hypertensive emergency, improved  #Chronic essential HTN   -Presented with shortness of breath and sweating at night  -Patient with known history of hypertension with noncompliance to his medication.  -Upon arrival blood pressure was 284/165.  He was given IV labetalol and decreased down to 216/127  -Lasix 40mg IV q12  -Amlodipine to 10mg every day  -Coreg to 12.5mg BID  -Losartan 50mg BID  -UDS negative, Uprot/Cr w/o significant proteinuria, TSH normal  -Cardiology assistance appreciated     #HFpEF with ADHF  #Dyspnea  -LVEF 55-60%, very difficult TTE evaluation  -BNP significantly elevated above 3000.  -Net negative 4.8L  -Diuresis with Lasix 40 mg IV every 12 hours  -Daily weight, strict input and output monitoring  -Cardiology assistance appreciated    #PARK, improved with diuresis  -?CRS  -labs a.m. on IV Lasix  -strict I/O and daily weights    #Possible UTI  -await UC  -IV CTX    #History of bipolar 1 disorder  -Cymbalta and Geodon     #Nicotine use disorder  -As needed nicotine patch  - Smoking cessation counseling    #Morbid obesity  #Probable DANIKA  -BMI:70  -outpatient sleep study advised  -recommend bariatric clinic evaluation as outpatient          Diet: Fluid restriction 2000 ML FLUID (and additional linked orders)  Combination Diet Low Saturated Fat Na <2400mg Diet    DVT Prophylaxis: Enoxaparin (Lovenox) SQ  [facility] Catheter: Not present  Lines: None     Cardiac Monitoring: None  Code Status: Full Code      Clinically Significant Risk Factors                   # Hypertension: Noted on problem list  # Acute heart failure with preserved ejection fraction:  "heart failure noted on problem list, last echo with EF >50%, and receiving IV diuretics           # Severe Obesity: Estimated body mass index is 71.48 kg/m  as calculated from the following:    Height as of this encounter: 1.854 m (6' 1\").    Weight as of this encounter: 245.8 kg (541 lb 12.8 oz)., PRESENT ON ADMISSION            Social Drivers of Health    Tobacco Use: High Risk (3/5/2024)    Received from [employer]    Patient History     Smoking Tobacco Use: Light Smoker     Smokeless Tobacco Use: Former          Disposition Plan     Medically Ready for Discharge: Anticipated in 2-4 Days             Emil Tucker DO, DO  Hospitalist Service  [facility]  Securely message with [employer] (more info)  Text page via [employer] Paging/Directory   ______________________________________________________________________    Interval History     Afebrile. No acute events overnight noted.     Physical Exam   Vital Signs: Temp: 97.9  F (36.6  C) Temp src: Oral BP: (!) 143/82 Pulse: 73   Resp: 20 SpO2: 93 % O2 Device: None (Room air)    Weight: 541 lbs 12.8 oz    General appearance - nad  Lungs - decreased bases, non labored  Heart - rrr. BLE edema, morbidly obese  Abdomen - soft, nontender, nondistended,  BS+, morbidly obese  Neurological - alert, oriented, normal speech, no focal findings or movement disorder noted  Skin - no wounds, c/c/p     Lab/imaging reviewed  "

## 2024-12-28 NOTE — PROGRESS NOTES
"  HEART CARE CONSULTATON NOTE        Assessment/Recommendations   Assessment/Plan:    Clinically Significant Risk Factors                     # Hypertension: Noted on problem list    # Acute heart failure with preserved ejection fraction: heart failure noted on problem list, last echo with EF >50%, and receiving IV diuretics           # Severe Obesity: Estimated body mass index is 71.48 kg/m  as calculated from the following:    Height as of this encounter: 1.854 m (6' 1\").    Weight as of this encounter: 245.8 kg (541 lb 12.8 oz)., PRESENT ON ADMISSION             Acute exacerbation of HFpEF  - Net negative 4.8 lit since admission per chart  - Continue lasix IV for one more day and transition to PO regimen tomorrow    2. HTN emergency on admission  - improving  - will start Spironolactone 50 mg given HFpEF, continue other meds  - will need follow up with nephrology as outpatient for further evaluation     3. Smoking counseled on cessation    4.  PARK  - h/o uncontrolled HTN  - continue to monitor           History of Present Illness/Subjective    HPI: Jason Cantrell is a 34 year old male with a history of morbid obesity (weight 536), bipolar, untreated HTN, tobacco who was admitted 12/26/2024 for dyspnea.     Feeling better since admission.      The left ventricle is normal in size.  There is severe concentric left ventricular hypertrophy.  The visual ejection fraction is 55-60%.  Regional wall motion abnormalities cannot be excluded due to limited  visualization.  The left ventricle is not well visualized.  The aortic valve is not well visualized.  No aortic stenosis is present.  The aorta root is normal.  Sinus rhythm was noted.  Technically difficult, suboptimal study. Very difficut to image cardiac  structures.  Recommend MRI or HAVEN to see cardiac structures.       Physical Examination  Review of Systems   VITALS: BP (!) 143/82 (BP Location: Right arm)   Pulse 73   Temp 97.9  F (36.6  C) (Oral)   Resp " "20   Ht 1.854 m (6' 1\")   Wt (!) 245.8 kg (541 lb 12.8 oz)   SpO2 93%   BMI 71.48 kg/m    BMI: Body mass index is 71.48 kg/m .  Wt Readings from Last 3 Encounters:   12/28/24 (!) 245.8 kg (541 lb 12.8 oz)   12/05/22 (!) 247.2 kg (545 lb)   05/03/18 (!) 247.2 kg (545 lb)       Intake/Output Summary (Last 24 hours) at 12/28/2024 1006  Last data filed at 12/28/2024 0954  Gross per 24 hour   Intake 1053 ml   Output 4265 ml   Net -3212 ml     General Appearance:   no distress, normal body habitus   ENT/Mouth: membranes moist, no oral lesions or bleeding gums.      EYES:  no scleral icterus, normal conjunctivae   Neck: no carotid bruits or thyromegaly   Chest/Lungs:   lungs are clear to auscultation, no rales or wheezing, no sternal scar, equal chest wall expansion    Cardiovascular:   Regular. Normal first and second heart sounds with no murmurs, rubs, or gallops; the carotid, radial and posterior tibial pulses are intact, no edema bilaterally    Abdomen:  no organomegaly, masses, bruits, or tenderness; bowel sounds are present   Extremities: no cyanosis or clubbing   Skin: no xanthelasma, warm.    Neurologic: normal  bilateral, no tremors     Psychiatric: alert and oriented x3, calm     Review Of Systems  Skin: negative  Eyes: negative  Ears/Nose/Throat: negative    Gastrointestinal: negative  Genitourinary: negative  Musculoskeletal: negative  Neurologic: negative  Psychiatric: negative  Hematologic/Lymphatic/Immunologic: negative  Endocrine: negative          Lab Results    Chemistry/lipid CBC Cardiac Enzymes/BNP/TSH/INR   No results for input(s): \"CHOL\", \"HDL\", \"LDL\", \"TRIG\", \"CHOLHDLRATIO\" in the last 54239 hours.  No results for input(s): \"LDL\" in the last 15762 hours.  Recent Labs   Lab Test 12/28/24 0425      POTASSIUM 3.9   CHLORIDE 101   CO2 23   GLC 90   BUN 19.8   CR 1.19*   GFRESTIMATED 82   DANIEL 9.4     Recent Labs   Lab Test 12/28/24 0425 12/27/24  0449 12/26/24  0413   CR 1.19* 1.27* 1.26* " "    Recent Labs   Lab Test 12/27/24 0449   A1C 5.0          Recent Labs   Lab Test 12/28/24 0425   WBC 11.0   HGB 15.2   HCT 45.1   MCV 84        Recent Labs   Lab Test 12/28/24 0425 12/27/24 0449 12/26/24 0413   HGB 15.2 14.3 14.4    No results for input(s): \"TROPONINI\" in the last 14712 hours.  Recent Labs   Lab Test 12/26/24 0413   NTBNPI 3,433*     Recent Labs   Lab Test 12/27/24 0449   TSH 2.27     No results for input(s): \"INR\" in the last 75803 hours.     Medical History  Surgical History Family History Social History   No past medical history on file.  Past Surgical History:   Procedure Laterality Date    HERNIA REPAIR       Family History   Problem Relation Age of Onset    Diabetes Mother     Diverticulitis Father     Alcoholism Brother         Social History     Socioeconomic History    Marital status: Single     Spouse name: Not on file    Number of children: Not on file    Years of education: Not on file    Highest education level: Not on file   Occupational History    Not on file   Tobacco Use    Smoking status: Every Day    Smokeless tobacco: Never    Tobacco comments:     Vapor   Substance and Sexual Activity    Alcohol use: Yes     Comment: Alcoholic Drinks/day: on occasion    Drug use: No    Sexual activity: Not on file   Other Topics Concern    Not on file   Social History Narrative    5/3/2016 - Lives in a group home.     Social Drivers of Health     Financial Resource Strain: Low Risk  (12/27/2024)    Financial Resource Strain     Within the past 12 months, have you or your family members you live with been unable to get utilities (heat, electricity) when it was really needed?: No   Food Insecurity: Low Risk  (12/27/2024)    Food Insecurity     Within the past 12 months, did you worry that your food would run out before you got money to buy more?: No     Within the past 12 months, did the food you bought just not last and you didn t have money to get more?: No   Transportation " Needs: Low Risk  (12/27/2024)    Transportation Needs     Within the past 12 months, has lack of transportation kept you from medical appointments, getting your medicines, non-medical meetings or appointments, work, or from getting things that you need?: No   Physical Activity: Not on file   Stress: Not on file   Social Connections: Not on file   Interpersonal Safety: Low Risk  (12/27/2024)    Interpersonal Safety     Do you feel physically and emotionally safe where you currently live?: Yes     Within the past 12 months, have you been hit, slapped, kicked or otherwise physically hurt by someone?: No     Within the past 12 months, have you been humiliated or emotionally abused in other ways by your partner or ex-partner?: No   Housing Stability: Low Risk  (12/27/2024)    Housing Stability     Do you have housing? : Yes     Are you worried about losing your housing?: No         Medications  Allergies   No current outpatient medications on file.      No Known Allergies      Ed Moran MD

## 2024-12-28 NOTE — PLAN OF CARE
Problem: Hypertension Acute  Goal: Blood Pressure Within Desired Range  Outcome: Progressing  Intervention: Normalize Blood Pressure  Recent Flowsheet Documentation  Taken 12/28/2024 0410 by Piedad Diaz, RN  Medication Review/Management: medications reviewed  Taken 12/28/2024 0010 by Piedad Diaz, RN  Medication Review/Management: medications reviewed     Goal Outcome Evaluation:  Pt denies pain. O2 sats in the upper 90s on RA. NSR. A/O. Up independently in room. Had a shower overnight. VSS. Will continue to monitor and notify MD of any changes.

## 2024-12-28 NOTE — PLAN OF CARE
Problem: Comorbidity Management  Goal: Blood Pressure in Desired Range  Outcome: Progressing   Goal Outcome Evaluation:    Blood pressures improving this afternoon, 136/77. Denied pain, shortness of breath. Patient reports feeling much better today. Up independently in room. Started on IV antibiotics for possible UTI. Tele discontinued. Patient not always measuring urine output, educated on importance of accurate output documentation. Mother at bedside this afternoon.

## 2024-12-28 NOTE — PLAN OF CARE
Pt is alert and oriented x4. Denies pain this shift. BP improving, -160s this shift. Scheduled BP meds given as well as IV lasix. Pt had a shower this shift. Mom visited and was at bedside. Pt is up independently to the bathroom. Maintains fluid restriction and is on I&Os. Pt wishes to go home tomorrow.    Problem: Adult Inpatient Plan of Care  Goal: Absence of Hospital-Acquired Illness or Injury  Outcome: Progressing  Intervention: Identify and Manage Fall Risk  Recent Flowsheet Documentation  Taken 12/27/2024 2030 by Christian Ratliff RN  Safety Promotion/Fall Prevention:   activity supervised   assistive device/personal items within reach   clutter free environment maintained   nonskid shoes/slippers when out of bed   patient and family education  Taken 12/27/2024 1630 by Christian Ratliff RN  Safety Promotion/Fall Prevention:   activity supervised   assistive device/personal items within reach   clutter free environment maintained   nonskid shoes/slippers when out of bed   patient and family education  Intervention: Prevent Skin Injury  Recent Flowsheet Documentation  Taken 12/27/2024 2030 by Christian Ratliff RN  Body Position: position changed independently  Taken 12/27/2024 1630 by Christian Ratliff RN  Body Position: position changed independently  Intervention: Prevent Infection  Recent Flowsheet Documentation  Taken 12/27/2024 2030 by Christian Ratliff RN  Infection Prevention:   hand hygiene promoted   single patient room provided   rest/sleep promoted  Taken 12/27/2024 1630 by Christian Ratliff RN  Infection Prevention:   hand hygiene promoted   single patient room provided   rest/sleep promoted  Goal: Optimal Comfort and Wellbeing  Outcome: Progressing     Problem: Comorbidity Management  Goal: Blood Pressure in Desired Range  Outcome: Progressing  Intervention: Maintain Blood Pressure Management  Recent Flowsheet Documentation  Taken 12/27/2024 2030 by Christian Ratliff  RN  Medication Review/Management: medications reviewed  Taken 12/27/2024 1630 by Christian Ratliff RN  Medication Review/Management: medications reviewed     Problem: Hypertension Acute  Goal: Blood Pressure Within Desired Range  Outcome: Progressing  Intervention: Normalize Blood Pressure  Recent Flowsheet Documentation  Taken 12/27/2024 2030 by Christian Ratliff, RN  Medication Review/Management: medications reviewed  Taken 12/27/2024 1630 by Christian Ratliff, RN  Medication Review/Management: medications reviewed   Goal Outcome Evaluation:

## 2024-12-29 LAB
ALBUMIN SERPL BCG-MCNC: 3.9 G/DL (ref 3.5–5.2)
ALP SERPL-CCNC: 82 U/L (ref 40–150)
ALT SERPL W P-5'-P-CCNC: 39 U/L (ref 0–70)
ANION GAP SERPL CALCULATED.3IONS-SCNC: 10 MMOL/L (ref 7–15)
AST SERPL W P-5'-P-CCNC: 38 U/L (ref 0–45)
BASOPHILS # BLD AUTO: 0.1 10E3/UL (ref 0–0.2)
BASOPHILS NFR BLD AUTO: 1 %
BILIRUB SERPL-MCNC: 0.7 MG/DL
BUN SERPL-MCNC: 22.6 MG/DL (ref 6–20)
CALCIUM SERPL-MCNC: 9.5 MG/DL (ref 8.8–10.4)
CHLORIDE SERPL-SCNC: 102 MMOL/L (ref 98–107)
CREAT SERPL-MCNC: 1.21 MG/DL (ref 0.67–1.17)
EGFRCR SERPLBLD CKD-EPI 2021: 81 ML/MIN/1.73M2
EOSINOPHIL # BLD AUTO: 0.2 10E3/UL (ref 0–0.7)
EOSINOPHIL NFR BLD AUTO: 2 %
ERYTHROCYTE [DISTWIDTH] IN BLOOD BY AUTOMATED COUNT: 14.1 % (ref 10–15)
GLUCOSE SERPL-MCNC: 87 MG/DL (ref 70–99)
HCO3 SERPL-SCNC: 25 MMOL/L (ref 22–29)
HCT VFR BLD AUTO: 43.4 % (ref 40–53)
HGB BLD-MCNC: 14.8 G/DL (ref 13.3–17.7)
IMM GRANULOCYTES # BLD: 0.1 10E3/UL
IMM GRANULOCYTES NFR BLD: 1 %
LYMPHOCYTES # BLD AUTO: 2.6 10E3/UL (ref 0.8–5.3)
LYMPHOCYTES NFR BLD AUTO: 25 %
MAGNESIUM SERPL-MCNC: 1.7 MG/DL (ref 1.7–2.3)
MCH RBC QN AUTO: 28.6 PG (ref 26.5–33)
MCHC RBC AUTO-ENTMCNC: 34.1 G/DL (ref 31.5–36.5)
MCV RBC AUTO: 84 FL (ref 78–100)
MONOCYTES # BLD AUTO: 1 10E3/UL (ref 0–1.3)
MONOCYTES NFR BLD AUTO: 9 %
NEUTROPHILS # BLD AUTO: 6.5 10E3/UL (ref 1.6–8.3)
NEUTROPHILS NFR BLD AUTO: 63 %
NRBC # BLD AUTO: 0 10E3/UL
NRBC BLD AUTO-RTO: 0 /100
PLATELET # BLD AUTO: 250 10E3/UL (ref 150–450)
POTASSIUM SERPL-SCNC: 3.7 MMOL/L (ref 3.4–5.3)
PROT SERPL-MCNC: 7.1 G/DL (ref 6.4–8.3)
RBC # BLD AUTO: 5.17 10E6/UL (ref 4.4–5.9)
SODIUM SERPL-SCNC: 137 MMOL/L (ref 135–145)
WBC # BLD AUTO: 10.4 10E3/UL (ref 4–11)

## 2024-12-29 PROCEDURE — 99232 SBSQ HOSP IP/OBS MODERATE 35: CPT | Performed by: INTERNAL MEDICINE

## 2024-12-29 PROCEDURE — 120N000004 HC R&B MS OVERFLOW

## 2024-12-29 PROCEDURE — 36415 COLL VENOUS BLD VENIPUNCTURE: CPT | Performed by: INTERNAL MEDICINE

## 2024-12-29 PROCEDURE — 250N000013 HC RX MED GY IP 250 OP 250 PS 637: Performed by: STUDENT IN AN ORGANIZED HEALTH CARE EDUCATION/TRAINING PROGRAM

## 2024-12-29 PROCEDURE — 250N000013 HC RX MED GY IP 250 OP 250 PS 637: Performed by: INTERNAL MEDICINE

## 2024-12-29 PROCEDURE — 250N000011 HC RX IP 250 OP 636: Performed by: STUDENT IN AN ORGANIZED HEALTH CARE EDUCATION/TRAINING PROGRAM

## 2024-12-29 PROCEDURE — 85004 AUTOMATED DIFF WBC COUNT: CPT | Performed by: INTERNAL MEDICINE

## 2024-12-29 PROCEDURE — 83735 ASSAY OF MAGNESIUM: CPT | Performed by: INTERNAL MEDICINE

## 2024-12-29 PROCEDURE — 85014 HEMATOCRIT: CPT | Performed by: INTERNAL MEDICINE

## 2024-12-29 PROCEDURE — 82310 ASSAY OF CALCIUM: CPT | Performed by: INTERNAL MEDICINE

## 2024-12-29 PROCEDURE — 82947 ASSAY GLUCOSE BLOOD QUANT: CPT | Performed by: INTERNAL MEDICINE

## 2024-12-29 PROCEDURE — 250N000011 HC RX IP 250 OP 636: Performed by: INTERNAL MEDICINE

## 2024-12-29 RX ORDER — LOSARTAN POTASSIUM 50 MG/1
50 TABLET ORAL ONCE
Status: COMPLETED | OUTPATIENT
Start: 2024-12-29 | End: 2024-12-29

## 2024-12-29 RX ORDER — LOSARTAN POTASSIUM 50 MG/1
50 TABLET ORAL ONCE
Status: DISCONTINUED | OUTPATIENT
Start: 2024-12-29 | End: 2024-12-29 | Stop reason: DRUGHIGH

## 2024-12-29 RX ORDER — LOSARTAN POTASSIUM 50 MG/1
100 TABLET ORAL DAILY
Status: DISCONTINUED | OUTPATIENT
Start: 2024-12-30 | End: 2024-12-31 | Stop reason: HOSPADM

## 2024-12-29 RX ADMIN — DULOXETINE HYDROCHLORIDE 60 MG: 60 CAPSULE, DELAYED RELEASE ORAL at 08:17

## 2024-12-29 RX ADMIN — MAGNESIUM OXIDE TAB 400 MG (241.3 MG ELEMENTAL MG) 400 MG: 400 (241.3 MG) TAB at 20:11

## 2024-12-29 RX ADMIN — ENOXAPARIN SODIUM 40 MG: 40 INJECTION SUBCUTANEOUS at 08:18

## 2024-12-29 RX ADMIN — HYDRALAZINE HYDROCHLORIDE 20 MG: 20 INJECTION INTRAMUSCULAR; INTRAVENOUS at 04:48

## 2024-12-29 RX ADMIN — LOSARTAN POTASSIUM 50 MG: 50 TABLET, FILM COATED ORAL at 08:17

## 2024-12-29 RX ADMIN — FUROSEMIDE 40 MG: 10 INJECTION, SOLUTION INTRAMUSCULAR; INTRAVENOUS at 20:12

## 2024-12-29 RX ADMIN — ENOXAPARIN SODIUM 40 MG: 40 INJECTION SUBCUTANEOUS at 20:14

## 2024-12-29 RX ADMIN — FUROSEMIDE 40 MG: 10 INJECTION, SOLUTION INTRAMUSCULAR; INTRAVENOUS at 08:18

## 2024-12-29 RX ADMIN — CARVEDILOL 12.5 MG: 12.5 TABLET, FILM COATED ORAL at 17:45

## 2024-12-29 RX ADMIN — ZIPRASIDONE HCL 40 MG: 20 CAPSULE ORAL at 08:18

## 2024-12-29 RX ADMIN — AMLODIPINE BESYLATE 10 MG: 5 TABLET ORAL at 08:17

## 2024-12-29 RX ADMIN — LOSARTAN POTASSIUM 50 MG: 50 TABLET, FILM COATED ORAL at 11:57

## 2024-12-29 RX ADMIN — ACETAMINOPHEN 650 MG: 325 TABLET ORAL at 09:26

## 2024-12-29 RX ADMIN — CARVEDILOL 12.5 MG: 12.5 TABLET, FILM COATED ORAL at 08:17

## 2024-12-29 RX ADMIN — SPIRONOLACTONE 50 MG: 25 TABLET, FILM COATED ORAL at 08:17

## 2024-12-29 RX ADMIN — MAGNESIUM OXIDE TAB 400 MG (241.3 MG ELEMENTAL MG) 400 MG: 400 (241.3 MG) TAB at 08:17

## 2024-12-29 NOTE — PLAN OF CARE
Goal Outcome Evaluation:      Plan of Care Reviewed With: patient    Overall Patient Progress: improving      Problem: Adult Inpatient Plan of Care  Goal: Optimal Comfort and Wellbeing  Outcome: Progressing     Problem: Adult Inpatient Plan of Care  Goal: Readiness for Transition of Care  Outcome: Progressing     Problem: Comorbidity Management  Goal: Blood Pressure in Desired Range  Outcome: Progressing     Problem: Hypertension Acute  Goal: Blood Pressure Within Desired Range  Outcome: Progressing    [id number]    Patient AxO x4. RA. Off tele. VSS. 2x dose of Hydralazine administered per MAR. Denies SOB, chest pain, and generalized pain. PRN Tylenol administered for headache around 0900. 2L fluid restriction and strict I+Os education reinforced. 3 showers during total shift to help with patient's anxiety. Hopeful plan to discharge tomorrow per Hospitalist, but awaiting cardiology's input. Patient able to make needs known, call light within reach. POCC.    Muna Wright RN on 12/29/2024 at 10:28 AM

## 2024-12-29 NOTE — PROGRESS NOTES
"  HEART CARE CONSULTATON NOTE        Assessment/Recommendations   Assessment/Plan:    Clinically Significant Risk Factors                     # Hypertension: Noted on problem list    # Acute heart failure with preserved ejection fraction: heart failure noted on problem list, last echo with EF >50%, and receiving IV diuretics           # Severe Obesity: Estimated body mass index is 71.24 kg/m  as calculated from the following:    Height as of this encounter: 1.854 m (6' 1\").    Weight as of this encounter: 244.9 kg (540 lb)., PRESENT ON ADMISSION             Acute exacerbation of HFpEF  - Net negative 4.8 lit since admission per chart  - Transition to PO Lasix 40 mg later today    2. HTN emergency on admission  - improving  - Started Spironolactone 50 mg given HFpEF on 12/28, changed losartan to 100 mg daily on 12/29  - will need follow up with nephrology as outpatient for further evaluation     3. Smoking counseled on cessation    4.  PARK  - h/o uncontrolled HTN  - continue to monitor           History of Present Illness/Subjective    HPI: Jason Cantrell is a 34 year old male with a history of morbid obesity (weight 536), bipolar, untreated HTN, tobacco who was admitted 12/26/2024 for dyspnea.     Feeling better since admission.      The left ventricle is normal in size.  There is severe concentric left ventricular hypertrophy.  The visual ejection fraction is 55-60%.  Regional wall motion abnormalities cannot be excluded due to limited  visualization.  The left ventricle is not well visualized.  The aortic valve is not well visualized.  No aortic stenosis is present.  The aorta root is normal.  Sinus rhythm was noted.  Technically difficult, suboptimal study. Very difficut to image cardiac  structures.  Recommend MRI or HAVEN to see cardiac structures.       Physical Examination  Review of Systems   VITALS: BP (!) 160/84   Pulse 70   Temp 98  F (36.7  C) (Oral)   Resp 20   Ht 1.854 m (6' 1\")   Wt (!) 244.9 " "kg (540 lb)   SpO2 97%   BMI 71.24 kg/m    BMI: Body mass index is 71.24 kg/m .  Wt Readings from Last 3 Encounters:   12/29/24 (!) 244.9 kg (540 lb)   12/05/22 (!) 247.2 kg (545 lb)   05/03/18 (!) 247.2 kg (545 lb)       Intake/Output Summary (Last 24 hours) at 12/28/2024 1006  Last data filed at 12/28/2024 0954  Gross per 24 hour   Intake 1053 ml   Output 4265 ml   Net -3212 ml     General Appearance:   no distress, normal body habitus   ENT/Mouth: membranes moist, no oral lesions or bleeding gums.      EYES:  no scleral icterus, normal conjunctivae   Neck: no carotid bruits or thyromegaly   Chest/Lungs:   lungs are clear to auscultation, no rales or wheezing, no sternal scar, equal chest wall expansion    Cardiovascular:   Regular. Normal first and second heart sounds with no murmurs, rubs, or gallops; the carotid, radial and posterior tibial pulses are intact, no edema bilaterally    Abdomen:  no organomegaly, masses, bruits, or tenderness; bowel sounds are present   Extremities: no cyanosis or clubbing   Skin: no xanthelasma, warm.    Neurologic: normal  bilateral, no tremors     Psychiatric: alert and oriented x3, calm     Review Of Systems  Skin: negative  Eyes: negative  Ears/Nose/Throat: negative    Gastrointestinal: negative  Genitourinary: negative  Musculoskeletal: negative  Neurologic: negative  Psychiatric: negative  Hematologic/Lymphatic/Immunologic: negative  Endocrine: negative          Lab Results    Chemistry/lipid CBC Cardiac Enzymes/BNP/TSH/INR   No results for input(s): \"CHOL\", \"HDL\", \"LDL\", \"TRIG\", \"CHOLHDLRATIO\" in the last 48481 hours.  No results for input(s): \"LDL\" in the last 33704 hours.  Recent Labs   Lab Test 12/28/24  0425      POTASSIUM 3.9   CHLORIDE 101   CO2 23   GLC 90   BUN 19.8   CR 1.19*   GFRESTIMATED 82   DANIEL 9.4     Recent Labs   Lab Test 12/28/24  0425 12/27/24  0449 12/26/24  0413   CR 1.19* 1.27* 1.26*     Recent Labs   Lab Test 12/27/24  0449   A1C 5.0 " "         Recent Labs   Lab Test 12/28/24 0425   WBC 11.0   HGB 15.2   HCT 45.1   MCV 84        Recent Labs   Lab Test 12/28/24 0425 12/27/24 0449 12/26/24  0413   HGB 15.2 14.3 14.4    No results for input(s): \"TROPONINI\" in the last 46641 hours.  Recent Labs   Lab Test 12/26/24 0413   NTBNPI 3,433*     Recent Labs   Lab Test 12/27/24 0449   TSH 2.27     No results for input(s): \"INR\" in the last 75368 hours.     Medical History  Surgical History Family History Social History   No past medical history on file.  Past Surgical History:   Procedure Laterality Date    HERNIA REPAIR       Family History   Problem Relation Age of Onset    Diabetes Mother     Diverticulitis Father     Alcoholism Brother         Social History     Socioeconomic History    Marital status: Single     Spouse name: Not on file    Number of children: Not on file    Years of education: Not on file    Highest education level: Not on file   Occupational History    Not on file   Tobacco Use    Smoking status: Every Day    Smokeless tobacco: Never    Tobacco comments:     Vapor   Substance and Sexual Activity    Alcohol use: Yes     Comment: Alcoholic Drinks/day: on occasion    Drug use: No    Sexual activity: Not on file   Other Topics Concern    Not on file   Social History Narrative    5/3/2016 - Lives in a group home.     Social Drivers of Health     Financial Resource Strain: Low Risk  (12/27/2024)    Financial Resource Strain     Within the past 12 months, have you or your family members you live with been unable to get utilities (heat, electricity) when it was really needed?: No   Food Insecurity: Low Risk  (12/27/2024)    Food Insecurity     Within the past 12 months, did you worry that your food would run out before you got money to buy more?: No     Within the past 12 months, did the food you bought just not last and you didn t have money to get more?: No   Transportation Needs: Low Risk  (12/27/2024)    Transportation Needs     " Within the past 12 months, has lack of transportation kept you from medical appointments, getting your medicines, non-medical meetings or appointments, work, or from getting things that you need?: No   Physical Activity: Not on file   Stress: Not on file   Social Connections: Not on file   Interpersonal Safety: Low Risk  (12/27/2024)    Interpersonal Safety     Do you feel physically and emotionally safe where you currently live?: Yes     Within the past 12 months, have you been hit, slapped, kicked or otherwise physically hurt by someone?: No     Within the past 12 months, have you been humiliated or emotionally abused in other ways by your partner or ex-partner?: No   Housing Stability: Low Risk  (12/27/2024)    Housing Stability     Do you have housing? : Yes     Are you worried about losing your housing?: No         Medications  Allergies   No current outpatient medications on file.      No Known Allergies      Ed Moran MD

## 2024-12-29 NOTE — PROGRESS NOTES
[facility]    Medicine Progress Note - Hospitalist Service    Date of Admission:  12/26/2024    Assessment & Plan   Jason Cantrell is a 34 year old male admitted on 12/26/2024. He presented with hypertensive emergency.  Past medical history significant for morbid obesity, hypertension and history of bipolar 1 disorder.     #Hypertensive emergency, resolved  #Chronic essential HTN   -Presented with shortness of breath and sweating at night  -Patient with known history of hypertension with noncompliance to his medication.  -Lasix 40mg IV q12  -Amlodipine 10mg every day  -Coreg 12.5mg BID  -Losartan 100mg every day  -Spironolactone 50mg qd  -UDS negative, Uprot/Cr w/o significant proteinuria, TSH normal  -Cardiology assistance appreciated     #HFpEF with ADHF  -LVEF 55-60%, very difficult TTE evaluation  -BNP significantly elevated above 3000.  -Net negative ~5L  -Lasix 40 mg IV every 12 hours  -Daily weight, strict input and output monitoring  -Cardiology assistance appreciated    #PARK, improved with diuresis  -?CRS  -Cr 1.27 -> 1.19 -> 1.21  -labs a.m. on IV Lasix  -strict I/O and daily weights    #Abnormal UA  -UC negative  -Stop IV CTX    #History of bipolar 1 disorder  -Cymbalta and Geodon     #Nicotine use disorder  -As needed nicotine patch  - Smoking cessation counseling    #Morbid obesity  #Probable DANIKA  -BMI:70  -outpatient sleep study advised  -recommend bariatric clinic evaluation as outpatient          Diet: Fluid restriction 2000 ML FLUID (and additional linked orders)  Combination Diet Low Saturated Fat Na <2400mg Diet    DVT Prophylaxis: Enoxaparin (Lovenox) SQ  [employer] Catheter: Not present  Lines: None     Cardiac Monitoring: None  Code Status: Full Code      Clinically Significant Risk Factors                   # Hypertension: Noted on problem list  # Acute heart failure with preserved ejection fraction: heart failure noted on problem list, last echo with EF >50%, and  "receiving IV diuretics           # Severe Obesity: Estimated body mass index is 71.24 kg/m  as calculated from the following:    Height as of this encounter: 1.854 m (6' 1\").    Weight as of this encounter: 244.9 kg (540 lb)., PRESENT ON ADMISSION            Social Drivers of Health    Tobacco Use: High Risk (3/5/2024)    Received from [employer]    Patient History     Smoking Tobacco Use: Light Smoker     Smokeless Tobacco Use: Former          Disposition Plan     Medically Ready for Discharge: Anticipated in 2-4 Days             Emil Tucker DO, DO  Hospitalist Service  [facility]  Securely message with [employer] (more info)  Text page via [employer] Paging/Directory   ______________________________________________________________________    Interval History     Afebrile. No acute events overnight noted.     Physical Exam   Vital Signs: Temp: 98  F (36.7  C) Temp src: Oral BP: (!) 160/84 Pulse: 70   Resp: 20 SpO2: 97 % O2 Device: None (Room air)    Weight: 540 lbs 0 oz    General appearance - nad  Lungs - decreased bases, non labored  Heart - rrr. BLE edema, morbidly obese  Abdomen - soft, nontender, nondistended,  BS+, morbidly obese  Neurological - alert, oriented x3  Skin - no wounds, c/c/p     Labs reviewed    "

## 2024-12-29 NOTE — PLAN OF CARE
Problem: Adult Inpatient Plan of Care  Goal: Optimal Comfort and Wellbeing  Outcome: Progressing     Problem: Comorbidity Management  Goal: Blood Pressure in Desired Range  Outcome: Progressing   Goal Outcome Evaluation:      Plan of Care Reviewed With: patient    Overall Patient Progress: improvingOverall Patient Progress: improving     Pt is A&Ox4, on RA, med-surg, and independent in the room. 2000 ml fluid restriction in place. Denies pain. Family at bedside during visiting hours.     Pt expressed eagerness to discharge home tomorrow.     Nellie Angela RN

## 2024-12-30 ENCOUNTER — TELEPHONE (OUTPATIENT)
Dept: CARDIOLOGY | Facility: CLINIC | Age: 34
End: 2024-12-30
Payer: COMMERCIAL

## 2024-12-30 DIAGNOSIS — I50.9 ACUTE DECOMPENSATED HEART FAILURE (H): Primary | ICD-10-CM

## 2024-12-30 LAB
CREAT SERPL-MCNC: 1.36 MG/DL (ref 0.67–1.17)
EGFRCR SERPLBLD CKD-EPI 2021: 70 ML/MIN/1.73M2
HOLD SPECIMEN: NORMAL
MAGNESIUM SERPL-MCNC: 1.8 MG/DL (ref 1.7–2.3)
PHOSPHATE SERPL-MCNC: 3.6 MG/DL (ref 2.5–4.5)
POTASSIUM SERPL-SCNC: 3.6 MMOL/L (ref 3.4–5.3)

## 2024-12-30 PROCEDURE — 84100 ASSAY OF PHOSPHORUS: CPT | Performed by: INTERNAL MEDICINE

## 2024-12-30 PROCEDURE — 99233 SBSQ HOSP IP/OBS HIGH 50: CPT | Performed by: INTERNAL MEDICINE

## 2024-12-30 PROCEDURE — 99232 SBSQ HOSP IP/OBS MODERATE 35: CPT | Performed by: INTERNAL MEDICINE

## 2024-12-30 PROCEDURE — 250N000011 HC RX IP 250 OP 636: Performed by: INTERNAL MEDICINE

## 2024-12-30 PROCEDURE — 83735 ASSAY OF MAGNESIUM: CPT | Performed by: INTERNAL MEDICINE

## 2024-12-30 PROCEDURE — 36415 COLL VENOUS BLD VENIPUNCTURE: CPT | Performed by: INTERNAL MEDICINE

## 2024-12-30 PROCEDURE — 120N000004 HC R&B MS OVERFLOW

## 2024-12-30 PROCEDURE — 250N000013 HC RX MED GY IP 250 OP 250 PS 637: Performed by: STUDENT IN AN ORGANIZED HEALTH CARE EDUCATION/TRAINING PROGRAM

## 2024-12-30 PROCEDURE — 250N000011 HC RX IP 250 OP 636: Performed by: STUDENT IN AN ORGANIZED HEALTH CARE EDUCATION/TRAINING PROGRAM

## 2024-12-30 PROCEDURE — 250N000013 HC RX MED GY IP 250 OP 250 PS 637: Performed by: INTERNAL MEDICINE

## 2024-12-30 PROCEDURE — 82565 ASSAY OF CREATININE: CPT | Performed by: INTERNAL MEDICINE

## 2024-12-30 PROCEDURE — 84132 ASSAY OF SERUM POTASSIUM: CPT | Performed by: INTERNAL MEDICINE

## 2024-12-30 RX ORDER — DOXAZOSIN 1 MG/1
2 TABLET ORAL AT BEDTIME
Status: DISCONTINUED | OUTPATIENT
Start: 2024-12-30 | End: 2024-12-31 | Stop reason: HOSPADM

## 2024-12-30 RX ORDER — CARVEDILOL 12.5 MG/1
12.5 TABLET ORAL ONCE
Status: COMPLETED | OUTPATIENT
Start: 2024-12-30 | End: 2024-12-30

## 2024-12-30 RX ORDER — CARVEDILOL 12.5 MG/1
25 TABLET ORAL 2 TIMES DAILY WITH MEALS
Status: DISCONTINUED | OUTPATIENT
Start: 2024-12-30 | End: 2024-12-31 | Stop reason: HOSPADM

## 2024-12-30 RX ADMIN — CARVEDILOL 12.5 MG: 12.5 TABLET, FILM COATED ORAL at 07:34

## 2024-12-30 RX ADMIN — DULOXETINE HYDROCHLORIDE 60 MG: 60 CAPSULE, DELAYED RELEASE ORAL at 07:33

## 2024-12-30 RX ADMIN — SPIRONOLACTONE 50 MG: 25 TABLET, FILM COATED ORAL at 08:17

## 2024-12-30 RX ADMIN — LOSARTAN POTASSIUM 100 MG: 50 TABLET, FILM COATED ORAL at 08:17

## 2024-12-30 RX ADMIN — MAGNESIUM OXIDE TAB 400 MG (241.3 MG ELEMENTAL MG) 400 MG: 400 (241.3 MG) TAB at 08:16

## 2024-12-30 RX ADMIN — DOXAZOSIN 2 MG: 1 TABLET ORAL at 20:30

## 2024-12-30 RX ADMIN — ENOXAPARIN SODIUM 40 MG: 40 INJECTION SUBCUTANEOUS at 08:17

## 2024-12-30 RX ADMIN — FUROSEMIDE 40 MG: 10 INJECTION, SOLUTION INTRAMUSCULAR; INTRAVENOUS at 08:16

## 2024-12-30 RX ADMIN — FUROSEMIDE 40 MG: 10 INJECTION, SOLUTION INTRAMUSCULAR; INTRAVENOUS at 20:30

## 2024-12-30 RX ADMIN — MAGNESIUM OXIDE TAB 400 MG (241.3 MG ELEMENTAL MG) 400 MG: 400 (241.3 MG) TAB at 20:30

## 2024-12-30 RX ADMIN — CARVEDILOL 12.5 MG: 12.5 TABLET, FILM COATED ORAL at 09:29

## 2024-12-30 RX ADMIN — ZIPRASIDONE HCL 40 MG: 20 CAPSULE ORAL at 08:17

## 2024-12-30 RX ADMIN — CARVEDILOL 25 MG: 12.5 TABLET, FILM COATED ORAL at 18:32

## 2024-12-30 RX ADMIN — ENOXAPARIN SODIUM 40 MG: 40 INJECTION SUBCUTANEOUS at 20:30

## 2024-12-30 RX ADMIN — AMLODIPINE BESYLATE 10 MG: 5 TABLET ORAL at 07:33

## 2024-12-30 NOTE — PROGRESS NOTES
[facility]    Medicine Progress Note - Hospitalist Service    Date of Admission:  12/26/2024    Assessment & Plan   Jason Cantrell is a 34 year old male admitted on 12/26/2024. He presented with hypertensive emergency.  Past medical history significant for morbid obesity, hypertension and history of bipolar 1 disorder.     #Hypertensive emergency, resolved  #Chronic essential HTN   -Presented with shortness of breath and sweating at night  -Patient with known history of hypertension with noncompliance to his medication.  -Lasix 40mg IV q12  -Amlodipine 10mg every day  -Coreg 25mg BID  -Losartan 100mg every day  -Spironolactone 50mg every day  -Doxazosin 2mg PO QHS  -UDS negative, Uprot/Cr w/o significant proteinuria, TSH normal  -Cardiology assistance appreciated     #HFpEF with ADHF  -LVEF 55-60%, very difficult TTE evaluation  -BNP significantly elevated above 3000.  -Net negative ~5L  -Lasix 40 mg IV every 12 hours  -Daily weight, strict input and output monitoring  -Cardiology assistance appreciated    #PARK  -?CRS  -Cr 1.27 -> 1.19 -> 1.21 -> 1.36 with ongoing IV diuresis  -labs a.m. on IV Lasix  -strict I/O and daily weights    #Abnormal UA  -UC negative    #History of bipolar 1 disorder  -Cymbalta and Geodon     #Nicotine use disorder  -As needed nicotine patch  - Smoking cessation counseling    #Morbid obesity  #Probable DANIKA  -BMI:70  -outpatient sleep study advised  -recommend bariatric clinic evaluation as outpatient  -Overnight oximetry          Diet: Fluid restriction 2000 ML FLUID (and additional linked orders)  Combination Diet Low Saturated Fat Na <2400mg Diet    DVT Prophylaxis: Enoxaparin (Lovenox) SQ  [employer] Catheter: Not present  Lines: None     Cardiac Monitoring: None  Code Status: Full Code      Clinically Significant Risk Factors                   # Hypertension: Noted on problem list  # Acute heart failure with preserved ejection fraction: heart failure noted on  "problem list, last echo with EF >50%, and receiving IV diuretics           # Severe Obesity: Estimated body mass index is 70.85 kg/m  as calculated from the following:    Height as of this encounter: 1.854 m (6' 1\").    Weight as of this encounter: 243.6 kg (537 lb).             Social Drivers of Health    Tobacco Use: High Risk (3/5/2024)    Received from [employer]    Patient History     Smoking Tobacco Use: Light Smoker     Smokeless Tobacco Use: Former          Disposition Plan     Medically Ready for Discharge: Anticipated Tomorrow             Emil Tucker DO, DO  Hospitalist Service  [facility]  Securely message with [employer] (more info)  Text page via [employer] Paging/Directory   ______________________________________________________________________    Interval History     Afebrile. No acute events overnight noted. BP's remain high.    Physical Exam   Vital Signs: Temp: 97.8  F (36.6  C) Temp src: Oral BP: (!) 162/88 Pulse: 80   Resp: 20 SpO2: 94 % O2 Device: None (Room air)    Weight: 537 lbs 0 oz    General appearance - nad  Lungs - decreased bases, non labored  Heart - rrr. BLE edema, morbidly obese  Abdomen - soft, nontender, nondistended,  BS+, morbidly obese  Neurological - alert, oriented x3  Skin - no wounds, c/c/p     Labs reviewed  "

## 2024-12-30 NOTE — PLAN OF CARE
Goal Outcome Evaluation:      Plan of Care Reviewed With: patient    Overall Patient Progress: improving      Problem: Adult Inpatient Plan of Care  Goal: Absence of Hospital-Acquired Illness or Injury  Outcome: Progressing     Problem: Adult Inpatient Plan of Care  Goal: Optimal Comfort and Wellbeing  Outcome: Progressing     Problem: Adult Inpatient Plan of Care  Goal: Readiness for Transition of Care  Outcome: Progressing     Problem: Comorbidity Management  Goal: Blood Pressure in Desired Range  Outcome: Progressing     Problem: Hypertension Acute  Goal: Blood Pressure Within Desired Range  Outcome: Progressing    Patient AxO x4. Off tele. RA. VSS. Denies SOB, chest pain, and generalized pain. 2L fluid restriction continued. Cares clustered. Independent in room. Patient able to make needs known, call light within reach. POCC.    Muna Wright RN on 12/30/2024 at 5:54 AM

## 2024-12-30 NOTE — PROGRESS NOTES
Cardiology Progress Note    Assessment/Plan:  1.  Acute HFpEF, likely secondary to severe uncontrolled hypertension.  Has diuresed 4300 cc since admission.  Will switch from IV to oral furosemide today.  Continue current dose of spironolactone.  2.  Hypertensive emergency on admission, slowly improving with medical therapy.  He continues to remain significantly hypertensive despite increased doses of carvedilol at maximum doses of amlodipine and losartan.  Will add doxazosin 2 mg at bedtime.  3.  Severe obesity with BMI of 71.2 on admission.  Suspect the patient may have undiagnosed DANIKA given his morbid obesity.  Will do a nocturnal oximetry run to assess.  If positive will need formal testing as an outpatient.  4.  PARK, stable.  Continue to monitor.    Primary cardiologist: Dr. Kern    Subjective:  Patient without any complaints.  Sleeping intermittently throughout my visit.  States he slept poorly last night as he was excited about the possibility of discharging home.  Told him I am not comfortable with him going home given poorly controlled blood pressures.  Will trial adding doxazosin at bedtime to see if this will help.    Current Facility-Administered Medications   Medication Dose Route Frequency Provider Last Rate Last Admin    amLODIPine (NORVASC) tablet 10 mg  10 mg Oral Daily Emil Tucker DO   10 mg at 12/30/24 0733    carvedilol (COREG) tablet 25 mg  25 mg Oral BID w/meals Emil Tucker DO        doxazosin (CARDURA) tablet 2 mg  2 mg Oral At Bedtime Allison Marina MD        DULoxetine (CYMBALTA) DR capsule 60 mg  60 mg Oral Daily Eloise Cohn MD   60 mg at 12/30/24 0733    enoxaparin ANTICOAGULANT (LOVENOX) injection 40 mg  40 mg Subcutaneous Q12H Eloise Cohn MD   40 mg at 12/30/24 0817    furosemide (LASIX) injection 40 mg  40 mg Intravenous Q12H Emil Tucker DO   40 mg at 12/30/24 0816    losartan (COZAAR) tablet 100 mg  100 mg Oral Daily Luisa  "MD Ed   100 mg at 12/30/24 0817    magnesium oxide (MAG-OX) tablet 400 mg  400 mg Oral BID Emil Tucker DO   400 mg at 12/30/24 0816    sodium chloride (PF) 0.9% PF flush 3 mL  3 mL Intracatheter Q8H Eloise Cohn MD   3 mL at 12/30/24 0817    spironolactone (ALDACTONE) tablet 50 mg  50 mg Oral Daily Ed Moran MD   50 mg at 12/30/24 0817    ziprasidone (GEODON) capsule 40 mg  40 mg Oral Daily Eloise Cohn MD   40 mg at 12/30/24 0817         Objective:   Vital signs in last 24 hours:  Temp:  [97.5  F (36.4  C)-97.8  F (36.6  C)] 97.8  F (36.6  C)  Pulse:  [72-80] 80  Resp:  [20-22] 20  BP: (141-214)/() 162/88  SpO2:  [94 %-96 %] 94 %  Weight:        Review of Systems:  Negative    Physical Exam:  General appearance: alert, cooperative, no distress   Head: Normocephalic, without obvious abnormality, atraumatic  Neck: no JVD   Lungs: Scattered end expiratory wheezes noted.  Heart: Regular rate and rhythm.  S1, S2 normal.  No murmur or gallop  Extremities: Trace lower extremity edema    Cardiographics (personally reviewed):   Telemetry demonstrates sinus rhythm    Imaging (personally reviewed):   No new cardiac imaging    Lab Results (personally reviewed):     No results for input(s): \"CHOL\", \"HDL\", \"LDL\", \"TRIG\", \"CHOLHDLRATIO\" in the last 92691 hours.  No results for input(s): \"LDL\" in the last 16048 hours.  Recent Labs   Lab Test 12/30/24  0535 12/29/24  0440   NA  --  137   POTASSIUM 3.6 3.7   CHLORIDE  --  102   CO2  --  25   GLC  --  87   BUN  --  22.6*   CR 1.36* 1.21*   GFRESTIMATED 70 81   DANIEL  --  9.5     Recent Labs   Lab Test 12/30/24  0535 12/29/24  0440 12/28/24  0425   CR 1.36* 1.21* 1.19*     Recent Labs   Lab Test 12/27/24 0449   A1C 5.0          Recent Labs   Lab Test 12/29/24 0440   WBC 10.4   HGB 14.8   HCT 43.4   MCV 84        Recent Labs   Lab Test 12/29/24  0440 12/28/24  0425 12/27/24 0449   HGB 14.8 15.2 14.3    No results for " "input(s): \"TROPONINI\" in the last 27398 hours.  Recent Labs   Lab Test 12/26/24  0413   NTBNPI 3,433*     Recent Labs   Lab Test 12/27/24  0449   TSH 2.27     No results for input(s): \"INR\" in the last 99434 hours.       Allison Marina MD          "

## 2024-12-31 VITALS
OXYGEN SATURATION: 94 % | SYSTOLIC BLOOD PRESSURE: 128 MMHG | HEIGHT: 73 IN | WEIGHT: 315 LBS | HEART RATE: 75 BPM | TEMPERATURE: 98 F | BODY MASS INDEX: 41.75 KG/M2 | RESPIRATION RATE: 20 BRPM | DIASTOLIC BLOOD PRESSURE: 74 MMHG

## 2024-12-31 LAB
ALBUMIN SERPL BCG-MCNC: 3.8 G/DL (ref 3.5–5.2)
ANION GAP SERPL CALCULATED.3IONS-SCNC: 12 MMOL/L (ref 7–15)
BUN SERPL-MCNC: 28.9 MG/DL (ref 6–20)
CALCIUM SERPL-MCNC: 9.5 MG/DL (ref 8.8–10.4)
CHLORIDE SERPL-SCNC: 103 MMOL/L (ref 98–107)
CREAT SERPL-MCNC: 1.43 MG/DL (ref 0.67–1.17)
EGFRCR SERPLBLD CKD-EPI 2021: 66 ML/MIN/1.73M2
GLUCOSE SERPL-MCNC: 85 MG/DL (ref 70–99)
HCO3 SERPL-SCNC: 24 MMOL/L (ref 22–29)
HOLD SPECIMEN: NORMAL
MAGNESIUM SERPL-MCNC: 2 MG/DL (ref 1.7–2.3)
PHOSPHATE SERPL-MCNC: 3.7 MG/DL (ref 2.5–4.5)
POTASSIUM SERPL-SCNC: 3.7 MMOL/L (ref 3.4–5.3)
SODIUM SERPL-SCNC: 139 MMOL/L (ref 135–145)

## 2024-12-31 PROCEDURE — 250N000013 HC RX MED GY IP 250 OP 250 PS 637: Performed by: INTERNAL MEDICINE

## 2024-12-31 PROCEDURE — 94762 N-INVAS EAR/PLS OXIMTRY CONT: CPT

## 2024-12-31 PROCEDURE — 250N000011 HC RX IP 250 OP 636: Performed by: STUDENT IN AN ORGANIZED HEALTH CARE EDUCATION/TRAINING PROGRAM

## 2024-12-31 PROCEDURE — 82310 ASSAY OF CALCIUM: CPT | Performed by: INTERNAL MEDICINE

## 2024-12-31 PROCEDURE — 250N000013 HC RX MED GY IP 250 OP 250 PS 637: Performed by: STUDENT IN AN ORGANIZED HEALTH CARE EDUCATION/TRAINING PROGRAM

## 2024-12-31 PROCEDURE — 36415 COLL VENOUS BLD VENIPUNCTURE: CPT | Performed by: INTERNAL MEDICINE

## 2024-12-31 PROCEDURE — 250N000011 HC RX IP 250 OP 636: Performed by: INTERNAL MEDICINE

## 2024-12-31 PROCEDURE — 99239 HOSP IP/OBS DSCHRG MGMT >30: CPT | Performed by: INTERNAL MEDICINE

## 2024-12-31 PROCEDURE — 83735 ASSAY OF MAGNESIUM: CPT | Performed by: INTERNAL MEDICINE

## 2024-12-31 RX ORDER — LOSARTAN POTASSIUM 100 MG/1
100 TABLET ORAL DAILY
Qty: 30 TABLET | Refills: 0 | Status: SHIPPED | OUTPATIENT
Start: 2025-01-01

## 2024-12-31 RX ORDER — AMLODIPINE BESYLATE 10 MG/1
10 TABLET ORAL DAILY
Qty: 30 TABLET | Refills: 0 | Status: SHIPPED | OUTPATIENT
Start: 2025-01-01

## 2024-12-31 RX ORDER — CHLORTHALIDONE 25 MG/1
25 TABLET ORAL DAILY
Qty: 30 TABLET | Refills: 0 | Status: SHIPPED | OUTPATIENT
Start: 2024-12-31

## 2024-12-31 RX ORDER — CARVEDILOL 25 MG/1
25 TABLET ORAL 2 TIMES DAILY WITH MEALS
Qty: 60 TABLET | Refills: 0 | Status: SHIPPED | OUTPATIENT
Start: 2024-12-31

## 2024-12-31 RX ORDER — SPIRONOLACTONE 50 MG/1
50 TABLET, FILM COATED ORAL DAILY
Qty: 30 TABLET | Refills: 0 | Status: SHIPPED | OUTPATIENT
Start: 2025-01-01

## 2024-12-31 RX ADMIN — CARVEDILOL 25 MG: 12.5 TABLET, FILM COATED ORAL at 08:08

## 2024-12-31 RX ADMIN — AMLODIPINE BESYLATE 10 MG: 5 TABLET ORAL at 08:08

## 2024-12-31 RX ADMIN — DULOXETINE HYDROCHLORIDE 60 MG: 60 CAPSULE, DELAYED RELEASE ORAL at 08:08

## 2024-12-31 RX ADMIN — MAGNESIUM OXIDE TAB 400 MG (241.3 MG ELEMENTAL MG) 400 MG: 400 (241.3 MG) TAB at 08:08

## 2024-12-31 RX ADMIN — LOSARTAN POTASSIUM 100 MG: 50 TABLET, FILM COATED ORAL at 08:08

## 2024-12-31 RX ADMIN — ENOXAPARIN SODIUM 40 MG: 40 INJECTION SUBCUTANEOUS at 08:08

## 2024-12-31 RX ADMIN — ZIPRASIDONE HCL 40 MG: 20 CAPSULE ORAL at 08:09

## 2024-12-31 RX ADMIN — SPIRONOLACTONE 50 MG: 25 TABLET, FILM COATED ORAL at 08:08

## 2024-12-31 RX ADMIN — FUROSEMIDE 40 MG: 10 INJECTION, SOLUTION INTRAMUSCULAR; INTRAVENOUS at 08:09

## 2024-12-31 ASSESSMENT — ACTIVITIES OF DAILY LIVING (ADL)
ADLS_ACUITY_SCORE: 35

## 2024-12-31 NOTE — DISCHARGE SUMMARY
"[facility]  Hospitalist Discharge Summary      Date of Admission:  12/26/2024  Date of Discharge:  12/31/2024  Discharging Provider: Emil Tucker DO, DO  Discharge Service: Hospitalist Service    Discharge Diagnoses       Clinically Significant Risk Factors     # Severe Obesity: Estimated body mass index is 71.19 kg/m  as calculated from the following:    Height as of this encounter: 1.854 m (6' 1\").    Weight as of this encounter: 244.8 kg (539 lb 9.6 oz).       Follow-ups Needed After Discharge   Follow-up Appointments       Hospital Follow-up with Existing Primary Care Provider (PCP)      Please see details below         Schedule Primary Care visit within: 7 Days               Unresulted Labs Ordered in the Past 30 Days of this Admission       No orders found from 11/26/2024 to 12/27/2024.            Discharge Disposition   Discharged to home  Condition at discharge: Stable    Hospital Course   Jason Cantrell is a 34 year old male admitted on 12/26/2024. He presented with hypertensive emergency.  Past medical history significant for morbid obesity, hypertension and history of bipolar 1 disorder.     #Hypertensive emergency, resolved  #Chronic essential HTN, improved   -Presented with shortness of breath and sweating at night  -Patient with known history of hypertension with noncompliance to his medication.  -Amlodipine 10mg every day  -Coreg 25mg BID  -Losartan 100mg every day  -Spironolactone 50mg every day  -Chlorthalidone 25mg every day on discharge in place of hydrochlorothiazide.  -UDS negative, Uprot/Cr w/o significant proteinuria, TSH normal  -Cardiology assistance appreciated     #HFpEF with ADHF  -LVEF 55-60%, very difficult TTE evaluation  -BNP significantly elevated above 3000.  -Net negative ~5L  -IV Lasix stopped on 12/31  -RX for Chlorthalidone on discharge in place of HCTZ  -Daily weight, strict input and output monitoring  -Cardiology assistance " appreciated    #PARK  -?CRS  -Cr 1.27 -> 1.19 -> 1.21 -> 1.36 -> 1.4 (likely elevation from IV Lasix)  -PCP in 1 week with repeat labs    #Abnormal UA  -UC negative    #History of bipolar 1 disorder  -Cymbalta and Geodon     #Nicotine use disorder  - Smoking cessation counseling    #Morbid obesity  #Probable DANIKA  -BMI:70  -outpatient sleep study advised  -recommend bariatric clinic evaluation as outpatient    Consultations This Hospital Stay   CORE CLINIC EVALUATION IP CONSULT  OCCUPATIONAL THERAPY ADULT IP CONSULT  CARDIOLOGY IP CONSULT    Code Status   Full Code    Time Spent on this Encounter   I, Emil Tucker DO, DO, personally saw the patient today and spent greater than 30 minutes discharging this patient.       Emil Tucker DO, DO  [facility] HE[facility] [address] [address]  Phone: [phone]  Fax: [phone]  ______________________________________________________________________    Physical Exam   Vital Signs: Temp: 98  F (36.7  C) Temp src: Oral BP: 128/74 Pulse: 75   Resp: 20 SpO2: 94 % O2 Device: None (Room air)    Weight: 539 lbs 9.6 oz    General appearance - nad  Lungs - decreased bases, non labored  Heart - rrr. BLE edema, morbidly obese  Abdomen - soft, nontender, nondistended,  BS+, morbidly obese  Neurological - alert, oriented x3  Skin - no wounds, c/c/p       Primary Care Physician   Rika Valdivia    Discharge Orders      Follow-Up with Cardiology      Reason for your hospital stay    Hypertensive emergency     Activity    Your activity upon discharge: activity as tolerated     Diet    Follow this diet upon discharge: Current Diet:Orders Placed This Encounter      Fluid restriction 2000 ML FLUID      Combination Diet Low Saturated Fat Na <2400mg Diet     Hospital Follow-up with Existing Primary Care Provider (PCP)    Please see details below            Significant Results and Procedures       Discharge Medications   Current  Discharge Medication List        START taking these medications    Details   carvedilol (COREG) 25 MG tablet Take 1 tablet (25 mg) by mouth 2 times daily (with meals).  Qty: 60 tablet, Refills: 0    Associated Diagnoses: Hypertensive emergency      chlorthalidone (HYGROTON) 25 MG tablet Take 1 tablet (25 mg) by mouth daily.  Qty: 30 tablet, Refills: 0    Associated Diagnoses: Hypertensive emergency      losartan (COZAAR) 100 MG tablet Take 1 tablet (100 mg) by mouth daily.  Qty: 30 tablet, Refills: 0    Associated Diagnoses: Hypertensive emergency      spironolactone (ALDACTONE) 50 MG tablet Take 1 tablet (50 mg) by mouth daily.  Qty: 30 tablet, Refills: 0    Associated Diagnoses: Hypertensive emergency           CONTINUE these medications which have CHANGED    Details   amLODIPine (NORVASC) 10 MG tablet Take 1 tablet (10 mg) by mouth daily.  Qty: 30 tablet, Refills: 0    Associated Diagnoses: Hypertensive emergency           CONTINUE these medications which have NOT CHANGED    Details   cholecalciferol, vitamin D3, (VITAMIN D3) 2,000 unit cap [CHOLECALCIFEROL, VITAMIN D3, (VITAMIN D3) 2,000 UNIT CAP] Take by mouth.      DULoxetine (CYMBALTA) 60 MG capsule [DULOXETINE (CYMBALTA) 60 MG CAPSULE] Take 60 mg by mouth daily.      MULTIVIT-MINERALS/FERROUS GLUC (CEROVITE ORAL) [MULTIVIT-MINERALS/FERROUS GLUC (CEROVITE ORAL)] Take by mouth.      niacin 500 MG tablet [NIACIN 500 MG TABLET] Take 500 mg by mouth daily with breakfast.      omega-3 acid ethyl esters (LOVAZA) 1 gram capsule [OMEGA-3 ACID ETHYL ESTERS (LOVAZA) 1 GRAM CAPSULE] Take 2,000 mg by mouth daily.      ziprasidone (GEODON) 40 MG capsule Take 40 mg by mouth daily.           STOP taking these medications       hydroCHLOROthiazide (HYDRODIURIL) 25 MG tablet Comments:   Reason for Stopping:             Allergies   No Known Allergies

## 2024-12-31 NOTE — PLAN OF CARE
Heart Failure Care Map  GOALS TO BE MET BEFORE DISCHARGE:    1. Decrease congestion and/or edema with diuretic therapy to achieve near optimal volume status.     Dyspnea improved: Yes, satisfactory for discharge.   Edema improved: No, further care required to meet this goal. Please explain trace ankle/feet edema        Last 24 hour I/O:   Intake/Output Summary (Last 24 hours) at 12/31/2024 0641  Last data filed at 12/31/2024 0600  Gross per 24 hour   Intake 750 ml   Output 1050 ml   Net -300 ml           Net I/O and Weights since admission:   12/01 0700 - 12/31 0659  In: 4243 [P.O.:4240; I.V.:3]  Out: 9192 [Urine:9192]  Net: -4949     Vitals:    12/26/24 0333 12/27/24 0355 12/28/24 0427 12/29/24 0718   Weight: (!) 243.1 kg (536 lb) (!) 248.7 kg (548 lb 3.2 oz) (!) 245.8 kg (541 lb 12.8 oz) (!) 244.9 kg (540 lb)    12/30/24 0500 12/31/24 0530   Weight: (!) 243.6 kg (537 lb) (!) 244.8 kg (539 lb 9.6 oz)       2.  O2 sats > 90% on room air, or at prior home O2 therapy level.      Able to wean O2 this shift to keep sats above 90%?: Yes, satisfactory for discharge.   Does patient use Home O2? No          Current oxygenation status:   SpO2: 94 %     O2 Device: None (Room air),      3.  Tolerates ambulation and mobility near baseline.     Ambulation: Yes, satisfactory for discharge.   Times patient ambulated with staff this shift: 0    Please review the Heart Failure Care Map for additional HF goal outcomes.    Sisi Bay RN  12/31/2024      Goal Outcome Evaluation:       Med surg pt. Ambulating independently in room. Overnight oximetry study done on RA, dipping into mid-high 80's occasionally. Pt.'s bp's in 150's/80's. No pain or SOB reported.

## 2024-12-31 NOTE — PROGRESS NOTES
Overnight Oximeter done; result upload to epic.  Pt remained on RA t/o  with SpO2 >90%.  Rt to follow.

## 2024-12-31 NOTE — PLAN OF CARE
Problem: Adult Inpatient Plan of Care  Goal: Absence of Hospital-Acquired Illness or Injury  Outcome: Adequate for Care Transition   Goal Outcome Evaluation:    Patient discharged. Gave him printed work note that the MD wrote for him. IV removed, discharge paperwork discussed with patient and all questions answered.

## 2024-12-31 NOTE — PROGRESS NOTES
To Whom It May Concern,      Please excuse Jason Cantrell from work for the time period of 12/26/2024 - 1/2/2025 for medical reasons.  Please contact me, or his primary care physician, should you have further questions regarding this matter.        Sincerely,    KAILEE Elise.O.  [facility]  [phone]

## 2025-01-15 ENCOUNTER — APPOINTMENT (OUTPATIENT)
Dept: CARDIOLOGY | Facility: CLINIC | Age: 35
End: 2025-01-15
Payer: COMMERCIAL

## 2025-01-15 ENCOUNTER — OFFICE VISIT (OUTPATIENT)
Dept: CARDIOLOGY | Facility: CLINIC | Age: 35
End: 2025-01-15
Payer: COMMERCIAL

## 2025-01-15 VITALS
RESPIRATION RATE: 14 BRPM | DIASTOLIC BLOOD PRESSURE: 77 MMHG | HEART RATE: 62 BPM | BODY MASS INDEX: 70.23 KG/M2 | HEIGHT: 74 IN | SYSTOLIC BLOOD PRESSURE: 115 MMHG

## 2025-01-15 DIAGNOSIS — I50.33 ACUTE ON CHRONIC HEART FAILURE WITH PRESERVED EJECTION FRACTION (H): Primary | ICD-10-CM

## 2025-01-15 DIAGNOSIS — I50.9 ACUTE DECOMPENSATED HEART FAILURE (H): ICD-10-CM

## 2025-01-15 DIAGNOSIS — E66.01 MORBID OBESITY (H): ICD-10-CM

## 2025-01-15 DIAGNOSIS — I10 ESSENTIAL HYPERTENSION: ICD-10-CM

## 2025-01-15 DIAGNOSIS — Z87.891 PERSONAL HISTORY OF TOBACCO USE, PRESENTING HAZARDS TO HEALTH: ICD-10-CM

## 2025-01-15 LAB
ANION GAP SERPL CALCULATED.3IONS-SCNC: 15 MMOL/L (ref 7–15)
BUN SERPL-MCNC: 25.3 MG/DL (ref 6–20)
CALCIUM SERPL-MCNC: 9.8 MG/DL (ref 8.8–10.4)
CHLORIDE SERPL-SCNC: 103 MMOL/L (ref 98–107)
CREAT SERPL-MCNC: 1.19 MG/DL (ref 0.67–1.17)
EGFRCR SERPLBLD CKD-EPI 2021: 82 ML/MIN/1.73M2
GLUCOSE SERPL-MCNC: 100 MG/DL (ref 70–99)
HCO3 SERPL-SCNC: 21 MMOL/L (ref 22–29)
NT-PROBNP SERPL-MCNC: 91 PG/ML (ref 0–450)
POTASSIUM SERPL-SCNC: 4.5 MMOL/L (ref 3.4–5.3)
SODIUM SERPL-SCNC: 139 MMOL/L (ref 135–145)

## 2025-01-15 PROCEDURE — 80048 BASIC METABOLIC PNL TOTAL CA: CPT | Performed by: NURSE PRACTITIONER

## 2025-01-15 PROCEDURE — 83880 ASSAY OF NATRIURETIC PEPTIDE: CPT | Performed by: NURSE PRACTITIONER

## 2025-01-15 PROCEDURE — 36415 COLL VENOUS BLD VENIPUNCTURE: CPT | Performed by: NURSE PRACTITIONER

## 2025-01-15 NOTE — PATIENT INSTRUCTIONS
It was a pleasure to see you today at the [facility].     Recommendations:  The nurse will call you to review your lab results.     Follow-up with primary care provider post hospitalization  Schedule sleep study.   Follow-up in the heart failure clinic in 1-2 months.   Schedule with general cardiologist. Decide between [facility].    Please call with questions/concerns and/or if you experience new or worsening heart failure symptoms (shortness of breath, weight gain, fluid retention/lower extremity swelling, and/or reduced activity tolerance).    Heart Failure Nurse Line: [phone] (M-F 8a-430t)  24-hour HF Nurse Line: [phone] (weekends, evenings, holidays)      Trista Ford CNP  [facility] [place]   Clinic and [phone]  Fax: [phone]  Heart Failure Nurses: [phone]

## 2025-01-15 NOTE — PROGRESS NOTES
[facility]  [address], [place] [address]  Office: [phone]     Assessment/Recommendations   Assessment: Mr. Cantrell presents to [facility] today for post hospitalization heart failure visit.    # Acute on chronic diastolic heart failure/HFpEF (EF 55-60%)  Stage C. NYHA Class II.  Most recent echo showed EF 55-60%, severe concentric left ventricular hypertrophy. Other structures/function not well visualized due to technically difficult study. Mild PARK noted with most recent admission to hospital otherwise based on known data, kidney function has remained stable. Unable to obtain on clinic scale today as he outweighs scale limits. Home bariatric scale sent home with patient today, hoping as he loses weight scale will produce readings. 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 Most recent outpatient weight within the last week was 529.5 lbs. Discharge from hospital weight on 12/31 was 539 lbs. Upon exam today, patient is well-compensated.    BP: controlled   Fluid status: difficult/suspect near euvolemic based on resolution of clinical sx; Diuretic: none, no KCL supp  Aldosterone antagonist: introduced today, deferred while other medical therapy is prioritized  SGLT2i: discussed today, plan to start if labs are stable   Ischemia evaluation: deferred while other medical therapies are prioritized  NSAID use: contraindicated  -Sleep apnea evaluation: referral to sleep clinic placed today    The mainstays of therapy for HFpEF include volume management, blood pressure control, treating underlying sleep apnea if present, treatment of underlying CAD if within the goals of care, weight management, exercise training, rate control for atrial fibrillation as well as consideration for a rhythm control strategy, and consideration for clinical trials. SGLT2 inhibitors are  currently the only medications that have proven evidence with benefit against HFpEF, and have received a class 2a recommendation. Sacubitril/valsartan (Entresto) and spironolactone have also had some evidence as well, receiving class 2b recommendation. Importantly, recommendation for beta blockers have been removed as there is some evidence for harm, thus preferential to avoid if possible. Beta blockers may adversely influence clinical outcomes by limiting chronotropic response in HFpEF.    Heart failure education including medication compliance and lifestyle management discussed today: low sodium diet <2g Na/day, fluid intake <2L/day, daily weight monitoring, and physical activity as tolerated. Heart failure education folder provided to patient today.    # Hypertension  -BP today 115/77  -Amlodipine 10 mg, losartan 100 mg, carvedilol 25 mg bid, chlorthalidone 25 mg    # Probable DANIKA  -Has been told that O2 saturations drop when sleeping and that he snores  -Sleep study ordered today    # Obesity  -Body mass index is 70.23 kg/m .    # Tobacco use  -Has cut back on use, smoking a pack of 20 cigarettes that last him ~3-weeks  -He uses smoking as stress relief  -Discussed cutting back and eventually cessation, further support and resources at his direction      Plan:  -BMP and NtproBNP today, will trial start jardiance if labs are stable  -Sleep study ordered    Follow up with PCP post hospitalization  Establish care with General Cardiology either with [facility] or [facility] per his preference  Follow up in the Heart Failure Clinic with [facility] in 1-2 months       The longitudinal plan of care for the condition(s) below were addressed during this visit. Due to the added complexity in care, I will continue to support Mr. Cantrell in the subsequent management of this condition(s) and with the ongoing continuity of care of this condition(s): HFpEF.     History of Present Illness/Subjective    Mr. Cantrell  "is a 34 year old male with a past medical history significant for HTN, HFpEF, morbid obesity, bipolar 1 disorder. Today patient presents to [facility] for post hospitalization heart failure visit.    Hospitalization, [facility] 12/26-12/31/2024. Presented with shortness of breath. He was found to have hypertensive emergency and acute decompensated heart failure. Echo showed EF 55-60%, severe concentric left ventricular hypertrophy, other structures/function not well visualized due to technically difficult study. BNP was elevated to 3433. He was treated with IV lasix. With mild PARK, chlorthalidone ordered to replace hydrochlorothiazide. He was not discharged on loop diuretics. Discharge from hospital weight was 539 lbs.     Today, patient denies chest pain, palpitations, lightheadedness/dizziness, shortness of breath, DESHPANDE, orthopnea, PND, fatigue/activity intolerance, abdominal fullness/bloating, and LE edema. Since hospitalized, patient has not noticed shortness of breath at rest. He has exertional shortness of breath that he experiences when exercising that is his baseline.     Patient has been going to the gym routinely to walk and lift weights. 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 He walks a decent amount during his days at work. He lives with his brother. He does not have a scale at home. 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 Most recent weight within the last week was 529.5 lbs.     He is signed up for meal plan through disability where he receives pre-portioned meals that are low in calories, sodium, and carbohydrates. He monitors heart rate at home with smart watch, HRs ranging .     He has cut back on alcohol intake since hospitalization. Prior to hospital was drinking whisky \"1-glass\" 1x/week. He now drinks 1-2x/month. He likely drinks >2L of fluid per day. He mostly drinks water and has been trying to cut " "back on pop.       Recent test results & labs below (personally reviewed):    Echocardiogram 12/26/2024  Interpretation Summary  The left ventricle is normal in size.  There is severe concentric left ventricular hypertrophy.  The visual ejection fraction is 55-60%.  Regional wall motion abnormalities cannot be excluded due to limited  visualization.  The left ventricle is not well visualized.  The aortic valve is not well visualized.  No aortic stenosis is present.  The aorta root is normal.  Sinus rhythm was noted.  Technically difficult, suboptimal study. Very difficut to image cardiac  structures.  Recommend MRI or HAVEN to see cardiac structures.     Physical Examination Review of Systems   /77 (BP Location: Right arm, Patient Position: Sitting, Cuff Size: Adult Large)   Pulse 62   Resp 14   Ht 1.867 m (6' 1.5\")   BMI 70.23 kg/m    Body mass index is 70.23 kg/m .  Wt Readings from Last 3 Encounters:   12/31/24 (!) 244.8 kg (539 lb 9.6 oz)   12/05/22 (!) 247.2 kg (545 lb)   05/03/18 (!) 247.2 kg (545 lb)     General Appearance:   Appears comfortable, in no acute distress   HEENT: Eyes symmetrical, no discharge or icterus bilaterally. Mucous membranes moist and without lesions   Cardiovascular: RRR, +S1S2, no murmur, rub, or gallop. JVP not visible at 90 degrees      Respiratory:   Respirations regular, even, and unlabored. Lungs CTA throughout   GI:  Soft, morbid obese   Musculoskeletal: No joint swelling or tenderness   Extremities   No cyanosis. Trace LE peripheral edema.   Skin: Warm, no xanthelasma, no jaundice, no rashes or lesions    Neurologic: Alert and oriented X3, no focal deficits   Psychiatric: calm and cooperative                                             Negative unless noted in HPI     Medical History  Surgical History Family History Social History   No past medical history on file. Past Surgical History:   Procedure Laterality Date    HERNIA REPAIR      Family History   Problem Relation " Age of Onset    Diabetes Mother     Diverticulitis Father     Alcoholism Brother     Social History     Socioeconomic History    Marital status: Single     Spouse name: Not on file    Number of children: Not on file    Years of education: Not on file    Highest education level: Not on file   Occupational History    Not on file   Tobacco Use    Smoking status: Every Day    Smokeless tobacco: Never    Tobacco comments:     Vapor   Substance and Sexual Activity    Alcohol use: Yes     Comment: Alcoholic Drinks/day: on occasion    Drug use: No    Sexual activity: Not on file   Other Topics Concern    Not on file   Social History Narrative    5/3/2016 - Lives in a group home.     Social Drivers of Health     Financial Resource Strain: Low Risk  (12/27/2024)    Financial Resource Strain     Within the past 12 months, have you or your family members you live with been unable to get utilities (heat, electricity) when it was really needed?: No   Food Insecurity: Low Risk  (12/27/2024)    Food Insecurity     Within the past 12 months, did you worry that your food would run out before you got money to buy more?: No     Within the past 12 months, did the food you bought just not last and you didn t have money to get more?: No   Transportation Needs: Low Risk  (12/27/2024)    Transportation Needs     Within the past 12 months, has lack of transportation kept you from medical appointments, getting your medicines, non-medical meetings or appointments, work, or from getting things that you need?: No   Physical Activity: Not on file   Stress: Not on file   Social Connections: Not on file   Interpersonal Safety: Low Risk  (12/27/2024)    Interpersonal Safety     Do you feel physically and emotionally safe where you currently live?: Yes     Within the past 12 months, have you been hit, slapped, kicked or otherwise physically hurt by someone?: No     Within the past 12 months, have you been humiliated or emotionally abused in other  ways by your partner or ex-partner?: No   Housing Stability: Low Risk  (2024)    Housing Stability     Do you have housing? : Yes     Are you worried about losing your housing?: No        Medications  Allergies   Current Outpatient Medications   Medication Sig Dispense Refill    amLODIPine (NORVASC) 10 MG tablet Take 1 tablet (10 mg) by mouth daily. 30 tablet 0    carvedilol (COREG) 25 MG tablet Take 1 tablet (25 mg) by mouth 2 times daily (with meals). 60 tablet 0    chlorthalidone (HYGROTON) 25 MG tablet Take 1 tablet (25 mg) by mouth daily. 30 tablet 0    cholecalciferol, vitamin D3, (VITAMIN D3) 2,000 unit cap Take 2,000 Units by mouth daily.      DULoxetine (CYMBALTA) 60 MG capsule [DULOXETINE (CYMBALTA) 60 MG CAPSULE] Take 60 mg by mouth daily.      losartan (COZAAR) 100 MG tablet Take 1 tablet (100 mg) by mouth daily. 30 tablet 0    MULTIVIT-MINERALS/FERROUS GLUC (CEROVITE ORAL) Take 1 tablet by mouth daily.      niacin 500 MG tablet [NIACIN 500 MG TABLET] Take 500 mg by mouth daily with breakfast.      omega-3 acid ethyl esters (LOVAZA) 1 gram capsule [OMEGA-3 ACID ETHYL ESTERS (LOVAZA) 1 GRAM CAPSULE] Take 2,000 mg by mouth daily.      spironolactone (ALDACTONE) 50 MG tablet Take 1 tablet (50 mg) by mouth daily. 30 tablet 0    ziprasidone (GEODON) 40 MG capsule Take 40 mg by mouth daily.      No Known Allergies      Lab Results    Chemistry/lipid CBC Cardiac Enzymes/BNP/TSH/INR   Lab Results   Component Value Date    BUN 28.9 (H) 2024     2024    CO2 24 2024    Lab Results   Component Value Date    WBC 10.4 2024    HGB 14.8 2024    HCT 43.4 2024    MCV 84 2024     2024    Lab Results   Component Value Date    TSH 2.27 2024                Trista Ford, DNP, APRN, CNP  [facility] - [facility] Failure [facility] [place]   Clinic and [phone]  Fax: [phone]  Heart Failure Nurses: [phone]

## 2025-01-15 NOTE — LETTER
1/15/2025    Rika Valdivia  [address] [facility] [address]    RE: Jason DUGAN Óscar       Dear Colleague,     I had the pleasure of seeing Jason Cantrell in the [facility].      [facility]  [address], [place] [address]  Office: [phone]     Assessment/Recommendations   Assessment: Mr. Cantrell presents to [facility] today for post hospitalization heart failure visit.    # Acute on chronic diastolic heart failure/HFpEF (EF 55-60%)  Stage C. NYHA Class II.  Most recent echo showed EF 55-60%, severe concentric left ventricular hypertrophy. Other structures/function not well visualized due to technically difficult study. Mild PARK noted with most recent admission to hospital otherwise based on known data, kidney function has remained stable. Unable to obtain on clinic scale today as he outweighs scale limits. Home bariatric scale sent home with patient today, hoping as he loses weight scale will produce readings. 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 Most recent outpatient weight within the last week was 529.5 lbs. Discharge from hospital weight on 12/31 was 539 lbs. Upon exam today, patient is well-compensated.    BP: controlled   Fluid status: difficult/suspect near euvolemic based on resolution of clinical sx; Diuretic: none, no KCL supp  Aldosterone antagonist: introduced today, deferred while other medical therapy is prioritized  SGLT2i: discussed today, plan to start if labs are stable   Ischemia evaluation: deferred while other medical therapies are prioritized  NSAID use: contraindicated  -Sleep apnea evaluation: referral to sleep clinic placed today    The mainstays of therapy for HFpEF include volume management, blood pressure control, treating underlying sleep apnea if present, treatment of underlying CAD if within the  goals of care, weight management, exercise training, rate control for atrial fibrillation as well as consideration for a rhythm control strategy, and consideration for clinical trials. SGLT2 inhibitors are currently the only medications that have proven evidence with benefit against HFpEF, and have received a class 2a recommendation. Sacubitril/valsartan (Entresto) and spironolactone have also had some evidence as well, receiving class 2b recommendation. Importantly, recommendation for beta blockers have been removed as there is some evidence for harm, thus preferential to avoid if possible. Beta blockers may adversely influence clinical outcomes by limiting chronotropic response in HFpEF.    Heart failure education including medication compliance and lifestyle management discussed today: low sodium diet <2g Na/day, fluid intake <2L/day, daily weight monitoring, and physical activity as tolerated. Heart failure education folder provided to patient today.    # Hypertension  -BP today 115/77  -Amlodipine 10 mg, losartan 100 mg, carvedilol 25 mg bid, chlorthalidone 25 mg    # Probable DANIKA  -Has been told that O2 saturations drop when sleeping and that he snores  -Sleep study ordered today    # Obesity  -Body mass index is 70.23 kg/m .    # Tobacco use  -Has cut back on use, smoking a pack of 20 cigarettes that last him ~3-weeks  -He uses smoking as stress relief  -Discussed cutting back and eventually cessation, further support and resources at his direction      Plan:  -BMP and NtproBNP today, will trial start jardiance if labs are stable  -Sleep study ordered    Follow up with PCP post hospitalization  Establish care with General Cardiology either with [facility] or [facility] per his preference  Follow up in the Heart Failure Clinic with [facility] in 1-2 months       The longitudinal plan of care for the condition(s) below were addressed during this visit. Due to the added complexity in care, I will continue  to support Mr. Cantrell in the subsequent management of this condition(s) and with the ongoing continuity of care of this condition(s): HFpEF.     History of Present Illness/Subjective    Mr. Cantrell is a 34 year old male with a past medical history significant for HTN, HFpEF, morbid obesity, bipolar 1 disorder. Today patient presents to [facility] for post hospitalization heart failure visit.    Hospitalization, [facility] 12/26-12/31/2024. Presented with shortness of breath. He was found to have hypertensive emergency and acute decompensated heart failure. Echo showed EF 55-60%, severe concentric left ventricular hypertrophy, other structures/function not well visualized due to technically difficult study. BNP was elevated to 3433. He was treated with IV lasix. With mild PARK, chlorthalidone ordered to replace hydrochlorothiazide. He was not discharged on loop diuretics. Discharge from hospital weight was 539 lbs.     Today, patient denies chest pain, palpitations, lightheadedness/dizziness, shortness of breath, DESHPANDE, orthopnea, PND, fatigue/activity intolerance, abdominal fullness/bloating, and LE edema. Since hospitalized, patient has not noticed shortness of breath at rest. He has exertional shortness of breath that he experiences when exercising that is his baseline.     Patient has been going to the gym routinely to walk and lift weights. 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 He walks a decent amount during his days at work. He lives with his brother. He does not have a scale at home. 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 Most recent weight within the last week was 529.5 lbs.     He is signed up for meal plan through disability where he receives pre-portioned meals that are low in calories, sodium, and carbohydrates. He monitors heart rate at home with smart watch, HRs ranging .     He has cut back on  "alcohol intake since hospitalization. Prior to hospital was drinking whisky \"1-glass\" 1x/week. He now drinks 1-2x/month. He likely drinks >2L of fluid per day. He mostly drinks water and has been trying to cut back on pop.       Recent test results & labs below (personally reviewed):    Echocardiogram 12/26/2024  Interpretation Summary  The left ventricle is normal in size.  There is severe concentric left ventricular hypertrophy.  The visual ejection fraction is 55-60%.  Regional wall motion abnormalities cannot be excluded due to limited  visualization.  The left ventricle is not well visualized.  The aortic valve is not well visualized.  No aortic stenosis is present.  The aorta root is normal.  Sinus rhythm was noted.  Technically difficult, suboptimal study. Very difficut to image cardiac  structures.  Recommend MRI or HAVEN to see cardiac structures.     Physical Examination Review of Systems   /77 (BP Location: Right arm, Patient Position: Sitting, Cuff Size: Adult Large)   Pulse 62   Resp 14   Ht 1.867 m (6' 1.5\")   BMI 70.23 kg/m    Body mass index is 70.23 kg/m .  Wt Readings from Last 3 Encounters:   12/31/24 (!) 244.8 kg (539 lb 9.6 oz)   12/05/22 (!) 247.2 kg (545 lb)   05/03/18 (!) 247.2 kg (545 lb)     General Appearance:   Appears comfortable, in no acute distress   HEENT: Eyes symmetrical, no discharge or icterus bilaterally. Mucous membranes moist and without lesions   Cardiovascular: RRR, +S1S2, no murmur, rub, or gallop. JVP not visible at 90 degrees      Respiratory:   Respirations regular, even, and unlabored. Lungs CTA throughout   GI:  Soft, morbid obese   Musculoskeletal: No joint swelling or tenderness   Extremities   No cyanosis. Trace LE peripheral edema.   Skin: Warm, no xanthelasma, no jaundice, no rashes or lesions    Neurologic: Alert and oriented X3, no focal deficits   Psychiatric: calm and cooperative                                             Negative unless noted in HPI "     Medical History  Surgical History Family History Social History   No past medical history on file. Past Surgical History:   Procedure Laterality Date     HERNIA REPAIR      Family History   Problem Relation Age of Onset     Diabetes Mother      Diverticulitis Father      Alcoholism Brother     Social History     Socioeconomic History     Marital status: Single     Spouse name: Not on file     Number of children: Not on file     Years of education: Not on file     Highest education level: Not on file   Occupational History     Not on file   Tobacco Use     Smoking status: Every Day     Smokeless tobacco: Never     Tobacco comments:     Vapor   Substance and Sexual Activity     Alcohol use: Yes     Comment: Alcoholic Drinks/day: on occasion     Drug use: No     Sexual activity: Not on file   Other Topics Concern     Not on file   Social History Narrative    5/3/2016 - Lives in a group home.     Social Drivers of Health     Financial Resource Strain: Low Risk  (12/27/2024)    Financial Resource Strain      Within the past 12 months, have you or your family members you live with been unable to get utilities (heat, electricity) when it was really needed?: No   Food Insecurity: Low Risk  (12/27/2024)    Food Insecurity      Within the past 12 months, did you worry that your food would run out before you got money to buy more?: No      Within the past 12 months, did the food you bought just not last and you didn t have money to get more?: No   Transportation Needs: Low Risk  (12/27/2024)    Transportation Needs      Within the past 12 months, has lack of transportation kept you from medical appointments, getting your medicines, non-medical meetings or appointments, work, or from getting things that you need?: No   Physical Activity: Not on file   Stress: Not on file   Social Connections: Not on file   Interpersonal Safety: Low Risk  (12/27/2024)    Interpersonal Safety      Do you feel physically and emotionally safe  where you currently live?: Yes      Within the past 12 months, have you been hit, slapped, kicked or otherwise physically hurt by someone?: No      Within the past 12 months, have you been humiliated or emotionally abused in other ways by your partner or ex-partner?: No   Housing Stability: Low Risk  (12/27/2024)    Housing Stability      Do you have housing? : Yes      Are you worried about losing your housing?: No        Medications  Allergies   Current Outpatient Medications   Medication Sig Dispense Refill     amLODIPine (NORVASC) 10 MG tablet Take 1 tablet (10 mg) by mouth daily. 30 tablet 0     carvedilol (COREG) 25 MG tablet Take 1 tablet (25 mg) by mouth 2 times daily (with meals). 60 tablet 0     chlorthalidone (HYGROTON) 25 MG tablet Take 1 tablet (25 mg) by mouth daily. 30 tablet 0     cholecalciferol, vitamin D3, (VITAMIN D3) 2,000 unit cap Take 2,000 Units by mouth daily.       DULoxetine (CYMBALTA) 60 MG capsule [DULOXETINE (CYMBALTA) 60 MG CAPSULE] Take 60 mg by mouth daily.       losartan (COZAAR) 100 MG tablet Take 1 tablet (100 mg) by mouth daily. 30 tablet 0     MULTIVIT-MINERALS/FERROUS GLUC (CEROVITE ORAL) Take 1 tablet by mouth daily.       niacin 500 MG tablet [NIACIN 500 MG TABLET] Take 500 mg by mouth daily with breakfast.       omega-3 acid ethyl esters (LOVAZA) 1 gram capsule [OMEGA-3 ACID ETHYL ESTERS (LOVAZA) 1 GRAM CAPSULE] Take 2,000 mg by mouth daily.       spironolactone (ALDACTONE) 50 MG tablet Take 1 tablet (50 mg) by mouth daily. 30 tablet 0     ziprasidone (GEODON) 40 MG capsule Take 40 mg by mouth daily.      No Known Allergies      Lab Results    Chemistry/lipid CBC Cardiac Enzymes/BNP/TSH/INR   Lab Results   Component Value Date    BUN 28.9 (H) 12/31/2024     12/31/2024    CO2 24 12/31/2024    Lab Results   Component Value Date    WBC 10.4 12/29/2024    HGB 14.8 12/29/2024    HCT 43.4 12/29/2024    MCV 84 12/29/2024     12/29/2024    Lab Results   Component Value  Date    TSH 2.27 2024                Trista Ford, APRYL, APRN, CNP  [facility] - Heart Failure Clinic [place]   Clinic and [phone]  Fax: [phone]  Heart Failure Nurses: [phone]      Thank you for allowing me to participate in the care of your patient.      Sincerely,     RAMON Wang [facility]  cc:   Anabelle Burns MD  [address]  [place] [address]

## 2025-01-16 ENCOUNTER — TELEPHONE (OUTPATIENT)
Dept: CARDIOLOGY | Facility: CLINIC | Age: 35
End: 2025-01-16
Payer: COMMERCIAL

## 2025-01-16 DIAGNOSIS — I50.9 ACUTE CONGESTIVE HEART FAILURE, UNSPECIFIED HEART FAILURE TYPE (H): Primary | ICD-10-CM

## 2025-01-16 NOTE — TELEPHONE ENCOUNTER
----- Message from Trista Ford sent at 1/16/2025  8:02 AM CST -----  Cr downtrending. BNP has normalized. Please call patient and offer him to start jardiance 10 mg daily. We talked about this yesterday and he was very interested. Recheck BMP in ~-2-weeks. Also wondering if he was able to get the scale we gave him to work at home?

## 2025-01-16 NOTE — TELEPHONE ENCOUNTER
Pt notified of labs and medication changes, Jardiance ordered and $10 copay card provided. BMP in two weeks advised. Still working on the scale, as it produces an error reading.  Sandra MORGAN RN BSN, CHFN, PCCN-K

## 2025-01-28 ENCOUNTER — LAB (OUTPATIENT)
Dept: CARDIOLOGY | Facility: CLINIC | Age: 35
End: 2025-01-28
Payer: COMMERCIAL

## 2025-01-28 DIAGNOSIS — I50.9 ACUTE CONGESTIVE HEART FAILURE, UNSPECIFIED HEART FAILURE TYPE (H): ICD-10-CM

## 2025-01-28 LAB
ANION GAP SERPL CALCULATED.3IONS-SCNC: 12 MMOL/L (ref 7–15)
BUN SERPL-MCNC: 26.5 MG/DL (ref 6–20)
CALCIUM SERPL-MCNC: 8.8 MG/DL (ref 8.8–10.4)
CHLORIDE SERPL-SCNC: 104 MMOL/L (ref 98–107)
CREAT SERPL-MCNC: 1.44 MG/DL (ref 0.67–1.17)
EGFRCR SERPLBLD CKD-EPI 2021: 65 ML/MIN/1.73M2
GLUCOSE SERPL-MCNC: 109 MG/DL (ref 70–99)
HCO3 SERPL-SCNC: 23 MMOL/L (ref 22–29)
POTASSIUM SERPL-SCNC: 3.9 MMOL/L (ref 3.4–5.3)
SODIUM SERPL-SCNC: 139 MMOL/L (ref 135–145)

## 2025-01-28 PROCEDURE — 36415 COLL VENOUS BLD VENIPUNCTURE: CPT

## 2025-01-28 PROCEDURE — 80048 BASIC METABOLIC PNL TOTAL CA: CPT

## 2025-03-18 ENCOUNTER — OFFICE VISIT (OUTPATIENT)
Dept: CARDIOLOGY | Facility: CLINIC | Age: 35
End: 2025-03-18
Payer: COMMERCIAL

## 2025-03-18 VITALS — DIASTOLIC BLOOD PRESSURE: 64 MMHG | RESPIRATION RATE: 14 BRPM | SYSTOLIC BLOOD PRESSURE: 146 MMHG | HEART RATE: 80 BPM

## 2025-03-18 DIAGNOSIS — I50.32 CHRONIC HEART FAILURE WITH PRESERVED EJECTION FRACTION (HFPEF) (H): Primary | ICD-10-CM

## 2025-03-18 DIAGNOSIS — I50.9 ACUTE CONGESTIVE HEART FAILURE, UNSPECIFIED HEART FAILURE TYPE (H): ICD-10-CM

## 2025-03-18 DIAGNOSIS — E66.01 MORBID OBESITY (H): ICD-10-CM

## 2025-03-18 DIAGNOSIS — Z87.891 PERSONAL HISTORY OF TOBACCO USE, PRESENTING HAZARDS TO HEALTH: ICD-10-CM

## 2025-03-18 DIAGNOSIS — I50.33 ACUTE ON CHRONIC HEART FAILURE WITH PRESERVED EJECTION FRACTION (H): ICD-10-CM

## 2025-03-18 DIAGNOSIS — I10 ESSENTIAL HYPERTENSION: ICD-10-CM

## 2025-03-18 LAB
ANION GAP SERPL CALCULATED.3IONS-SCNC: 13 MMOL/L (ref 7–15)
BUN SERPL-MCNC: 15.3 MG/DL (ref 6–20)
CALCIUM SERPL-MCNC: 9.7 MG/DL (ref 8.8–10.4)
CHLORIDE SERPL-SCNC: 105 MMOL/L (ref 98–107)
CREAT SERPL-MCNC: 1.24 MG/DL (ref 0.67–1.17)
EGFRCR SERPLBLD CKD-EPI 2021: 78 ML/MIN/1.73M2
GLUCOSE SERPL-MCNC: 109 MG/DL (ref 70–99)
HCO3 SERPL-SCNC: 25 MMOL/L (ref 22–29)
NT-PROBNP SERPL-MCNC: 91 PG/ML (ref 0–450)
POTASSIUM SERPL-SCNC: 3.8 MMOL/L (ref 3.4–5.3)
SODIUM SERPL-SCNC: 143 MMOL/L (ref 135–145)

## 2025-03-18 PROCEDURE — 3077F SYST BP >= 140 MM HG: CPT | Performed by: NURSE PRACTITIONER

## 2025-03-18 PROCEDURE — 36415 COLL VENOUS BLD VENIPUNCTURE: CPT | Performed by: NURSE PRACTITIONER

## 2025-03-18 PROCEDURE — 83880 ASSAY OF NATRIURETIC PEPTIDE: CPT | Performed by: NURSE PRACTITIONER

## 2025-03-18 PROCEDURE — G2211 COMPLEX E/M VISIT ADD ON: HCPCS | Performed by: NURSE PRACTITIONER

## 2025-03-18 PROCEDURE — 80048 BASIC METABOLIC PNL TOTAL CA: CPT | Performed by: NURSE PRACTITIONER

## 2025-03-18 PROCEDURE — 99214 OFFICE O/P EST MOD 30 MIN: CPT | Performed by: NURSE PRACTITIONER

## 2025-03-18 PROCEDURE — 3078F DIAST BP <80 MM HG: CPT | Performed by: NURSE PRACTITIONER

## 2025-03-18 RX ORDER — ASCORBIC ACID 500 MG
1000 TABLET ORAL DAILY
COMMUNITY

## 2025-03-18 NOTE — PATIENT INSTRUCTIONS
It was a pleasure to see you today at the [facility].     Recommendations:  The nurse will call you to review your lab results and recommendations.   Monitor blood pressure at home  Referral to weight management clinic today, they will call you to schedule (If you don t hear from a representative within 2 business days, please call [phone])    -Continue to follow a low sodium diet (<2g/day) and maintain adequate fluid intake (~50-60 oz/day).   -Continue to monitor your weight daily and please call if you gain 2 lbs or more in 1 day OR 5 lbs or more in 1-week.    Follow-up in the heart failure clinic with [facility] in ~1-month.  Follow-up with general cardiology, Dr. Kern- establish care ~3-months    Please call with questions/concerns and/or if you experience new or worsening heart failure symptoms (shortness of breath, weight gain, fluid retention/lower extremity swelling, and/or reduced activity tolerance).    Heart Failure Nurse Line: [phone] (M-F [id number])  24-hour HF Nurse Line: [phone] (weekends, evenings, holidays)    Trista Ford CNP  [facility] - [facility]  Clinic and [phone]  Fax: [phone]  Heart Failure Nurses: [phone]

## 2025-03-18 NOTE — LETTER
3/18/2025    Rika Valdivia  [address] [facility] [address]    RE: Jason DUGAN Óscar       Dear Colleague,     I had the pleasure of seeing Jason Cantrell in the [facility].      [facility]  [address], [place] [address]  Office: [phone]     Assessment/Recommendations   Assessment: Mr. Cantrell presents to [facility] today for post hospitalization heart failure visit.    # Chronic diastolic heart failure/HFpEF (EF 55-60%)  Stage C. NYHA Class II.  Most recent echo showed EF 55-60%, severe concentric left ventricular hypertrophy. Other structures/function not well visualized due to technically difficult study. Patient hospitalized 12/26-12/31/2025 for ADHF. Mild transient fluctuations in Cr with med changes otherwise, based on known data, kidney function has remained stable. Unable to obtain on clinic scale today as he outweighs scale limits. Home bariatric scale sent home with patient at last visit has produced unreliable readings, scale limits 550 lbs so may be unable to use reliably until he is well below that limit. Discharge from hospital weight on 12/31 was 539 lbs. Upon exam today, patient is well-compensated. No overt fluid retention. Exam is difficult due to body habitus. Suspect that he is retaining fluid given non-compliance with Na and fluid restrictions, increased abdominal fullness, elevated blood pressure, and although unreliable readings on home scale, weights ranging much higher 248- 270 lbs. BMP and NtproBNP today. Low threshold to add loop diuretic.     BP: un-controlled   Fluid status: difficult exam/suspect hypervolemic; Diuretic: none, no KCL supp  Aldosterone antagonist: Spironolactone 50 mg  SGLT2i: Jardiance 10 mg  Ischemia evaluation: deferred while other medical therapies are prioritized  NSAID use: contraindicated  -Sleep apnea evaluation: referral to sleep clinic in place-  needs to be scheduled    The mainstays of therapy for HFpEF include volume management, blood pressure control, treating underlying sleep apnea if present, treatment of underlying CAD if within the goals of care, weight management, exercise training, rate control for atrial fibrillation as well as consideration for a rhythm control strategy, and consideration for clinical trials. SGLT2 inhibitors are currently the only medications that have proven evidence with benefit against HFpEF, and have received a class 2a recommendation. Sacubitril/valsartan (Entresto) and spironolactone have also had some evidence as well, receiving class 2b recommendation. Importantly, recommendation for beta blockers have been removed as there is some evidence for harm, thus preferential to avoid if possible. Beta blockers may adversely influence clinical outcomes by limiting chronotropic response in HFpEF.    Heart failure education including medication compliance and lifestyle management reviewed today: low sodium diet <2g Na/day, fluid intake <2L/day, daily weight monitoring, and physical activity as tolerated.     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  -Has been told that O2 saturations drop when sleeping and that he snores  -Sleep study ordered at previous visit, needs to be scheduled     # Obesity  -Weight exceeds limits of clinic scale, unable to weigh today  -Discharge from hospital weight 12/31/2024 was 539 lbs  -Weighed at [facility] mid January 2025 529.5 lbs  -Patient was issued home bariatric scale at last office visit, unreliable readings may be d/t equipment error (scale limit 550 lbs), patient reporting home readings 548-570 lbs and scale does not consistently work  -Estimated BMI ~70-75  -Referral to weight management clinic placed today    # Tobacco use  -Discussed cutting back and eventually  cessation, further support and resources at his direction      Plan:  -BMP and NtproBNP today, anticipate adding loop diuretic if BNP is elevated  -Monitor BP at home, consider adding additional agent for better management  -Reduce Na intake, aim for <2g/day and reduce H20 intake, aim for <2L/day  -Referral to weight loss clinic  -Continue to attempt and log daily weights at home    Follow up in the Heart Failure Clinic with CECY in ~1-month.   Establish care with general cardiology- outpatient, referral placed today ~3-months    The longitudinal plan of care for the condition(s) below were addressed during this visit. Due to the added complexity in care, I will continue to support Mr. Cantrell in the subsequent management of this condition(s) and with the ongoing continuity of care of this condition(s): HFpEF.     History of Present Illness/Subjective    Mr. Cantrell is a 34 year old male with a past medical history significant for HTN, HFpEF, morbid obesity, bipolar 1 disorder. Today patient presents to [facility] for post hospitalization heart failure visit.    Hospitalization, [facility] 12/26-12/31/2024. Presented with shortness of breath. He was found to have hypertensive emergency and acute decompensated heart failure. Echo showed EF 55-60%, severe concentric left ventricular hypertrophy, other structures/function not well visualized due to technically difficult study. BNP was elevated to 3433. He was treated with IV lasix. With mild PARK, chlorthalidone ordered to replace hydrochlorothiazide. He was not discharged on loop diuretics. Discharge from hospital weight was 539 lbs.     Patient was last seen in the heart failure clinic on 1/15/2025 by myself. At that time he denied s/sx of HF. Jardiance was added to med regimen.     Today, patient reports he is feeling well. He denies shortness of breath at rest. He does think he may be retaining fluid given increased abdominal  fullness. He has stable exertional shortness of breath that he experiences when exercising that is his baseline. He has been unable to record consistent reliable weights at home likely d/t weight exceeding scale limits.     Patient began membership at the [employer] and is working with .     He has been trying to watch sodium in his diet, thinks he could improve in this area. Mother helps with grocery shopping. He is drinking upwards 4L of fluid per day.     He denies chest pain, palpitations, lightheadedness/dizziness, shortness of breath, DESHPANDE, orthopnea, PND, fatigue/activity intolerance, and LE edema.      Recent test results & labs below (personally reviewed):    Echocardiogram 12/26/2024  Interpretation Summary  The left ventricle is normal in size.  There is severe concentric left ventricular hypertrophy.  The visual ejection fraction is 55-60%.  Regional wall motion abnormalities cannot be excluded due to limited  visualization.  The left ventricle is not well visualized.  The aortic valve is not well visualized.  No aortic stenosis is present.  The aorta root is normal.  Sinus rhythm was noted.  Technically difficult, suboptimal study. Very difficut to image cardiac  structures.  Recommend MRI or HAVEN to see cardiac structures.     Physical Examination Review of Systems   BP (!) 146/64   Pulse 80   Resp 14   There is no height or weight on file to calculate BMI.  Wt Readings from Last 3 Encounters:   12/31/24 (!) 244.8 kg (539 lb 9.6 oz)   12/05/22 (!) 247.2 kg (545 lb)   05/03/18 (!) 247.2 kg (545 lb)     General Appearance:   Appears comfortable, in no acute distress   HEENT: Eyes symmetrical, no discharge or icterus bilaterally. Mucous membranes moist and without lesions   Cardiovascular: RRR, +S1S2, no murmur, rub, or gallop. JVP not visible at 90 degrees      Respiratory:   Respirations regular, even, and unlabored. Lungs CTA throughout   GI:  Soft, morbid obese   Musculoskeletal: No joint  swelling or tenderness   Extremities   No cyanosis. No peripheral edema.   Skin: Warm, no xanthelasma, no jaundice, no rashes or lesions    Neurologic: Alert and oriented X3, no focal deficits   Psychiatric: calm and cooperative                                             Negative unless noted in HPI     Medical History  Surgical History Family History Social History   No past medical history on file. Past Surgical History:   Procedure Laterality Date     HERNIA REPAIR      Family History   Problem Relation Age of Onset     Diabetes Mother      Diverticulitis Father      Alcoholism Brother     Social History     Socioeconomic History     Marital status: Single     Spouse name: Not on file     Number of children: Not on file     Years of education: Not on file     Highest education level: Not on file   Occupational History     Not on file   Tobacco Use     Smoking status: Every Day     Smokeless tobacco: Never   Substance and Sexual Activity     Alcohol use: Yes     Comment: Alcoholic Drinks/day: on occasion     Drug use: No     Sexual activity: Not on file   Other Topics Concern     Not on file   Social History Narrative    5/3/2016 - Lives in a group home.     Social Drivers of Health     Financial Resource Strain: Low Risk  (12/27/2024)    Financial Resource Strain      Within the past 12 months, have you or your family members you live with been unable to get utilities (heat, electricity) when it was really needed?: No   Food Insecurity: Low Risk  (12/27/2024)    Food Insecurity      Within the past 12 months, did you worry that your food would run out before you got money to buy more?: No      Within the past 12 months, did the food you bought just not last and you didn t have money to get more?: No   Transportation Needs: Low Risk  (12/27/2024)    Transportation Needs      Within the past 12 months, has lack of transportation kept you from medical appointments, getting your medicines, non-medical meetings or  appointments, work, or from getting things that you need?: No   Physical Activity: Not on file   Stress: Not on file   Social Connections: Not on file   Interpersonal Safety: Low Risk  (12/27/2024)    Interpersonal Safety      Do you feel physically and emotionally safe where you currently live?: Yes      Within the past 12 months, have you been hit, slapped, kicked or otherwise physically hurt by someone?: No      Within the past 12 months, have you been humiliated or emotionally abused in other ways by your partner or ex-partner?: No   Housing Stability: Low Risk  (12/27/2024)    Housing Stability      Do you have housing? : Yes      Are you worried about losing your housing?: No        Medications  Allergies   Current Outpatient Medications   Medication Sig Dispense Refill     amLODIPine (NORVASC) 10 MG tablet Take 1 tablet (10 mg) by mouth daily. 30 tablet 0     carvedilol (COREG) 25 MG tablet Take 1 tablet (25 mg) by mouth 2 times daily (with meals). 60 tablet 0     chlorthalidone (HYGROTON) 25 MG tablet Take 1 tablet (25 mg) by mouth daily. 30 tablet 0     cholecalciferol (VITAMIN D3) 125 mcg (5000 units) capsule Take 125 mcg by mouth daily.       cholecalciferol, vitamin D3, (VITAMIN D3) 2,000 unit cap Take 2,000 Units by mouth daily.       DULoxetine (CYMBALTA) 60 MG capsule [DULOXETINE (CYMBALTA) 60 MG CAPSULE] Take 60 mg by mouth daily.       empagliflozin (JARDIANCE) 10 MG TABS tablet Take 1 tablet (10 mg) by mouth daily. 90 tablet 1     losartan (COZAAR) 100 MG tablet Take 1 tablet (100 mg) by mouth daily. 30 tablet 0     MULTIVIT-MINERALS/FERROUS GLUC (CEROVITE ORAL) Take 1 tablet by mouth daily.       niacin 500 MG tablet [NIACIN 500 MG TABLET] Take 500 mg by mouth daily with breakfast.       omega-3 acid ethyl esters (LOVAZA) 1 gram capsule [OMEGA-3 ACID ETHYL ESTERS (LOVAZA) 1 GRAM CAPSULE] Take 2,000 mg by mouth daily.       spironolactone (ALDACTONE) 50 MG tablet Take 1 tablet (50 mg) by mouth  daily. 30 tablet 0     vitamin C (ASCORBIC ACID) 500 MG tablet Take 1,000 mg by mouth daily.       ziprasidone (GEODON) 40 MG capsule Take 40 mg by mouth daily.      No Known Allergies      Lab Results    Chemistry/lipid CBC Cardiac Enzymes/BNP/TSH/INR   Lab Results   Component Value Date    BUN 15.3 2025     2025    CO2 25 2025    Lab Results   Component Value Date    WBC 10.4 2024    HGB 14.8 2024    HCT 43.4 2024    MCV 84 2024     2024    Lab Results   Component Value Date    TSH 2.27 2024            Trista Ford, DNP, APRN, CNP  [facility] [place]   Clinic and [phone]  Fax: [phone]  Heart Failure Nurses: [phone]      Thank you for allowing me to participate in the care of your patient.      Sincerely,     RAMON Wang [facility]  cc:   Trista Ford CNP  [address] [place] [address]

## 2025-03-19 ENCOUNTER — TELEPHONE (OUTPATIENT)
Dept: CARDIOLOGY | Facility: CLINIC | Age: 35
End: 2025-03-19
Payer: COMMERCIAL

## 2025-03-19 DIAGNOSIS — I16.1 HYPERTENSIVE EMERGENCY: ICD-10-CM

## 2025-03-19 DIAGNOSIS — I50.9 ACUTE CONGESTIVE HEART FAILURE, UNSPECIFIED HEART FAILURE TYPE (H): Primary | ICD-10-CM

## 2025-03-19 RX ORDER — LOSARTAN POTASSIUM 100 MG/1
100 TABLET ORAL DAILY
Qty: 90 TABLET | Refills: 3 | Status: SHIPPED | OUTPATIENT
Start: 2025-03-19

## 2025-03-19 RX ORDER — CHLORTHALIDONE 25 MG/1
25 TABLET ORAL DAILY
Qty: 90 TABLET | Refills: 1 | Status: SHIPPED | OUTPATIENT
Start: 2025-03-19

## 2025-03-19 RX ORDER — AMLODIPINE BESYLATE 10 MG/1
10 TABLET ORAL DAILY
Qty: 90 TABLET | Refills: 1 | Status: SHIPPED | OUTPATIENT
Start: 2025-03-19

## 2025-03-19 RX ORDER — TORSEMIDE 10 MG/1
10 TABLET ORAL DAILY
Qty: 90 TABLET | Refills: 3 | Status: SHIPPED | OUTPATIENT
Start: 2025-03-19

## 2025-03-19 RX ORDER — CARVEDILOL 25 MG/1
25 TABLET ORAL 2 TIMES DAILY WITH MEALS
Qty: 180 TABLET | Refills: 3 | Status: SHIPPED | OUTPATIENT
Start: 2025-03-19

## 2025-03-19 RX ORDER — SPIRONOLACTONE 50 MG/1
50 TABLET, FILM COATED ORAL DAILY
Qty: 90 TABLET | Refills: 3 | Status: SHIPPED | OUTPATIENT
Start: 2025-03-19

## 2025-03-19 NOTE — TELEPHONE ENCOUNTER
----- Message from Trista Ford sent at 3/19/2025  9:04 AM CDT -----  Stable labs. BNP is wnl. Suspect BNP may be falsely low d/t BMI. Patient exceeds BMI adjusted thresholds for BMI. Historically BNP has elevated when he was admitted to the hospital and this was in the setting of likely 20+ lb fluid retention. I am strongly suspect that he is retaining fluid based on clinic visit yesterday.   -Start low dose diuretic, torsemide 10 mg daily, please ask him monitor for HF sx closely and to call us to check in next week  -Encourage 50-60 oz of fluid per day, he is currently drinking double that  -Encourage low sodium diet, currently he is not keeping track  -Continue to try tracking daily weights either on home scale, gym scale, or friend's scale at [employer] this will be very helpful in managing fluid status, keep log

## 2025-03-19 NOTE — TELEPHONE ENCOUNTER
Voicemail left for patient.    Thank you    Jhony Yap RN    ---------------------------------    ----- Message from Trista Ford sent at 3/19/2025  9:04 AM CDT -----  Stable labs. BNP is wnl. Suspect BNP may be falsely low d/t BMI. Patient exceeds BMI adjusted thresholds for BMI. Historically BNP has elevated when he was admitted to the hospital and this was in the setting of likely 20+ lb fluid retention. I am strongly suspect that he is retaining fluid based on clinic visit yesterday.   -Start low dose diuretic, torsemide 10 mg daily, please ask him monitor for HF sx closely and to call us to check in next week  -Encourage 50-60 oz of fluid per day, he is currently drinking double that  -Encourage low sodium diet, currently he is not keeping track  -Continue to try tracking daily weights either on home scale, gym scale, or friend's scale at [employer] this will be very helpful in managing fluid status, keep log

## 2025-03-19 NOTE — TELEPHONE ENCOUNTER
Patient called us back.  We relayed the updates and he endorsed understanding.  Patient reported his PCP sent a temporary refill of his discharged medications listed previously and has asked for more long term refills.    Patient reports going on vacation 3/24 to 3/29.  We reviewed the importance on staying focused on his HF diet goals in the period and is encouraged to call the clinic with questions/concerns.    We will call patient on 3/29 to assess Wt, and HF S/S.    MAR update:  Torsemide 10 mg.  1 tablet (10 mg) daily    Following Rx refilled:  Amlodipine, Carvedilol, Chlorthalidone, Losartan, and Spironolactone.     SHYANN Weston    Thank you    Jhony Yap RN

## 2025-03-19 NOTE — TELEPHONE ENCOUNTER
On review of patient's MAR we should see if patient needs refills on Amlodipine, Carvedilol,  Chlorthalidone, Losartan, and Spironolactone.  We confirmed with Trista in her office that this would be appropriate.     Thank you    Jhony Yap    -----------------------------    ---- Message from Trista Ford sent at 3/19/2025  9:04 AM CDT -----  Stable labs. BNP is wnl. Suspect BNP may be falsely low d/t BMI. Patient exceeds BMI adjusted thresholds for BMI. Historically BNP has elevated when he was admitted to the hospital and this was in the setting of likely 20+ lb fluid retention. I am strongly suspect that he is retaining fluid based on clinic visit yesterday.   -Start low dose diuretic, torsemide 10 mg daily, please ask him monitor for HF sx closely and to call us to check in next week  -Encourage 50-60 oz of fluid per day, he is currently drinking double that  -Encourage low sodium diet, currently he is not keeping track  -Continue to try tracking daily weights either on home scale, gym scale, or friend's scale at [employer] this will be very helpful in managing fluid status, keep log

## 2025-03-31 NOTE — TELEPHONE ENCOUNTER
"  Called patient to assess his wt, HF s/s today since returning home from vacation.    Wt: (has not been weighing) 469 lbs today    HF S/S: \"I feel great\", denies any symptoms.  Denies SOB, swelling, or difficulty breathing while sleeping    BP: (has not been taking)    Patient reported he needs to get batteries and will get to the store today and start taking Wt and BP daily and recording it.    Patient reported a lot of walking on his trip and is recovering from the added activity.  He stated he takes measured breaks with activity to avoid SOB.    Rx: Carvedilol 25 mg BID, Jardiance 10 mg a day, losartan 100 mg daily, spironolactone 50 mg a day, torsemide 10 mg a day    Last NTproBNP was WDL, last Creatinine 1.24    SHYANN Weston    Thank you    Jhony Yap RN  "

## 2025-04-24 NOTE — PROGRESS NOTES
[facility]  [address], [place] [address]  Office: [phone]     Assessment/Recommendations   Assessment: Mr. Cantrell presents to [facility] today for heart failure focused follow-up visit.    # Chronic diastolic heart failure/HFpEF (EF 55-60% per TTE 12/26/2024)  Stage C. NYHA Class II.  Patient hospitalized 12/26-12/31/2025 for ADHF. Most recent echo showed EF 55-60%, severe concentric left ventricular hypertrophy. Other structures/function not well visualized due to technically difficult study.    Kidney function has remained stable. Based on known date Cr wnl ~2018, Cr ranging ~1.1-1.4 since hospitalization 12/2024 with transient fluctuations in Cr. Monitoring closely, most recent Cr 1.24 (3/2025). Recheck BMP today.    Unable to obtain weigh on clinic scale today as he outweighs scale limits. Home scale readings have been stable for several weeks, this week he weighed 558 lbs on home scale. For reference, discharge from hospital weight on 12/31 was documented at 539 lbs. Upon exam today, patient is well-compensated. No overt fluid retention. Exam is difficult due to body habitus. Since last visit he has been watching sodium and fluid intake much closer. He tells me today that his clothes feel looser in the waistband.     BP: stable/~controlled  Fluid status: difficult exam; Diuretic: torsemide 10 mg, no KCL supp  Aldosterone antagonist: Spironolactone 50 mg  SGLT2i: Jardiance 10 mg  Ischemia evaluation: deferred while other medical therapies are prioritized  NSAID use: contraindicated  -Sleep apnea evaluation: referral to sleep clinic in place    The mainstays of therapy for HFpEF include volume management, blood pressure control, treating underlying sleep apnea if present, treatment of underlying CAD if within the goals of care, weight management, exercise training, rate control for atrial fibrillation as well as consideration  for a rhythm control strategy, and consideration for clinical trials. SGLT2 inhibitors are currently the only medications that have proven evidence with benefit against HFpEF, and have received a class 2a recommendation. Sacubitril/valsartan (Entresto) and spironolactone have also had some evidence as well, receiving class 2b recommendation. Importantly, recommendation for beta blockers have been removed as there is some evidence for harm, thus preferential to avoid if possible. Beta blockers may adversely influence clinical outcomes by limiting chronotropic response in HFpEF.    Heart failure education including medication compliance and lifestyle management reviewed today: low sodium diet <2g Na/day, fluid intake <2L/day, daily weight monitoring, and physical activity as tolerated.     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  -Weight exceeds limits of clinic scale, unable to weigh today, weight at home this week was 558 lbs  -Discharge from hospital weight 12/31/2024 was documented 539 lbs  -Referral to weight management clinic in place, encouraged patient to schedule or to work with PCP to consider medication to help with weight loss  -Encouraged to continue to following heart healthy low sodium diet and exercise     # Prediabetes  -A1C in prediabetes range first documented in 2019. Most recently with improved A1C 5.0 (12/2024)    # Tobacco use  -Has decreased cigarette use, currently smoking 1-pack q3 weeks  -Counseled continuing to cut back and eventually cessation, further support and resources at his direction      Plan:  BMP today  Monitor BP at home, consider  adding additional agent for better management  Continue daily weight monitoring, record on log  Emphasis today on heart healthy low sodium diet, exercise, and weight loss    -Follow-up with general cardiology, Dr. Kern- scheduled 6/18/2025.   -Follow-up in the heart failure clinic with CECY in ~2-months, sooner if needed    The longitudinal plan of care for the condition(s) below were addressed during this visit. Due to the added complexity in care, I will continue to support Mr. Cantrell in the subsequent management of this condition(s) and with the ongoing continuity of care of this condition(s): HFpEF.     History of Present Illness/Subjective    Mr. Cantrell is a 34 year old male with a past medical history significant for HTN, HFpEF, morbid obesity, bipolar 1 disorder. Today patient presents to [facility] for heart failure focused follow-up visit.    Primary Cardiologist: Establish care visit scheduled with Dr. Kern 6/18/2025.    Hospitalization, [facility] 12/26-12/31/2024. Presented with shortness of breath. He was found to have hypertensive emergency and acute decompensated heart failure. Echo showed EF 55-60%, severe concentric left ventricular hypertrophy, other structures/function not well visualized due to technically difficult study. BNP was elevated to 3433. He was treated with IV lasix. With mild PARK, chlorthalidone ordered to replace hydrochlorothiazide. He was not discharged on loop diuretics. Discharge from hospital weight was 539 lbs.     Patient was seen in the heart failure clinic on 1/15/2025 by myself. At that time he denied s/sx of HF. Jardiance was added to med regimen. Again was seen by myself on 3/18/2025, c/o abdominal fullness and torsemide was added. Of note he was drinking upwards 4L of fluid per day and thought he could improve upon overall sodium intake. Unable to obtain clinic weights in either of these visits due to weight exceeding scale  "limits.     Today, patient reports he has been feeling well. He has stable exertional shortness of breath that he experiences when exercising that is his baseline. He has been able to weigh on home scale several times with readings relatively stable over the past few weeks: ~560-562 lbs. He was 558 lbs today. He has cut daily fluid back to ~1.5-2L and is watching the salt in his diet more closely. He has added more vegetables to his diet. He tells me today that his clothes feel looser in the waistband. He continues to exercise at [employer]. Patient is active at his job, works at [employer]. He has ordered a blood pressure cuff but has yet to receive it in the mail.     He denies chest pain, palpitations, lightheadedness/dizziness, presyncope, syncope, shortness of breath at rest, orthopnea, PND, fatigue/activity intolerance, abdominal fullness/bloating, LE edema, and bleeding.      Recent test results & labs below (personally reviewed):    Echocardiogram 12/26/2024  Interpretation Summary  The left ventricle is normal in size.  There is severe concentric left ventricular hypertrophy.  The visual ejection fraction is 55-60%.  Regional wall motion abnormalities cannot be excluded due to limited  visualization.  The left ventricle is not well visualized.  The aortic valve is not well visualized.  No aortic stenosis is present.  The aorta root is normal.  Sinus rhythm was noted.  Technically difficult, suboptimal study. Very difficut to image cardiac  structures.  Recommend MRI or HAVEN to see cardiac structures.     Physical Examination Review of Systems   BP (!) 156/78 (BP Location: Right arm, Patient Position: Sitting, Cuff Size: Adult Large)   Pulse 80   Resp 16   Ht 1.867 m (6' 1.5\")   Wt (!) 253.1 kg (558 lb)   BMI 72.62 kg/m    Body mass index is 72.62 kg/m .  Wt Readings from Last 3 Encounters:   04/28/25 (!) 253.1 kg (558 lb)   12/31/24 (!) 244.8 kg (539 lb 9.6 oz)   12/05/22 (!) 247.2 kg (545 lb)     General " Appearance:   Appears comfortable, in no acute distress   HEENT: Eyes symmetrical, no discharge or icterus bilaterally. Mucous membranes moist and without lesions   Cardiovascular: RRR, +S1S2, no murmur, rub, or gallop. JVP not visible at 90 degrees      Respiratory:   Respirations regular, even, and unlabored. Lungs CTA throughout   GI:  Soft, morbid obese   Musculoskeletal: No joint swelling or tenderness   Extremities   No cyanosis. No peripheral edema.   Skin: Warm, no xanthelasma, no jaundice, no rashes or lesions    Neurologic: Alert and oriented X3, no focal deficits   Psychiatric: calm and cooperative                                             Negative unless noted in HPI     Medical History  Surgical History Family History Social History   No past medical history on file. Past Surgical History:   Procedure Laterality Date    HERNIA REPAIR      Family History   Problem Relation Age of Onset    Diabetes Mother     Diverticulitis Father     Alcoholism Brother     Social History     Socioeconomic History    Marital status: Single     Spouse name: Not on file    Number of children: Not on file    Years of education: Not on file    Highest education level: Not on file   Occupational History    Not on file   Tobacco Use    Smoking status: Every Day    Smokeless tobacco: Never   Substance and Sexual Activity    Alcohol use: Yes     Comment: Alcoholic Drinks/day: on occasion    Drug use: No    Sexual activity: Not on file   Other Topics Concern    Not on file   Social History Narrative    5/3/2016 - Lives in a group home.     Social Drivers of Health     Financial Resource Strain: Low Risk  (12/27/2024)    Financial Resource Strain     Within the past 12 months, have you or your family members you live with been unable to get utilities (heat, electricity) when it was really needed?: No   Food Insecurity: Low Risk  (12/27/2024)    Food Insecurity     Within the past 12 months, did you worry that your food would run  out before you got money to buy more?: No     Within the past 12 months, did the food you bought just not last and you didn t have money to get more?: No   Transportation Needs: Low Risk  (12/27/2024)    Transportation Needs     Within the past 12 months, has lack of transportation kept you from medical appointments, getting your medicines, non-medical meetings or appointments, work, or from getting things that you need?: No   Physical Activity: Not on file   Stress: Not on file   Social Connections: Not on file   Interpersonal Safety: Low Risk  (12/27/2024)    Interpersonal Safety     Do you feel physically and emotionally safe where you currently live?: Yes     Within the past 12 months, have you been hit, slapped, kicked or otherwise physically hurt by someone?: No     Within the past 12 months, have you been humiliated or emotionally abused in other ways by your partner or ex-partner?: No   Housing Stability: Low Risk  (12/27/2024)    Housing Stability     Do you have housing? : Yes     Are you worried about losing your housing?: No        Medications  Allergies   Current Outpatient Medications   Medication Sig Dispense Refill    amLODIPine (NORVASC) 10 MG tablet Take 1 tablet (10 mg) by mouth daily. 90 tablet 1    carvedilol (COREG) 25 MG tablet Take 1 tablet (25 mg) by mouth 2 times daily (with meals). 180 tablet 3    CERTAVITE/ANTIOXIDANTS tablet Take 1 tablet by mouth.      chlorthalidone (HYGROTON) 25 MG tablet Take 1 tablet (25 mg) by mouth daily. 90 tablet 1    cholecalciferol (VITAMIN D3) 125 mcg (5000 units) capsule Take 125 mcg by mouth daily.      DULoxetine (CYMBALTA) 60 MG capsule [DULOXETINE (CYMBALTA) 60 MG CAPSULE] Take 60 mg by mouth daily.      empagliflozin (JARDIANCE) 10 MG TABS tablet Take 1 tablet (10 mg) by mouth daily. 90 tablet 1    losartan (COZAAR) 100 MG tablet Take 1 tablet (100 mg) by mouth daily. 90 tablet 3    MULTIVIT-MINERALS/FERROUS GLUC (CEROVITE ORAL) Take 1 tablet by mouth  daily.      niacin 500 MG tablet [NIACIN 500 MG TABLET] Take 500 mg by mouth daily with breakfast.      omega-3 acid ethyl esters (LOVAZA) 1 gram capsule [OMEGA-3 ACID ETHYL ESTERS (LOVAZA) 1 GRAM CAPSULE] Take 2,000 mg by mouth daily.      spironolactone (ALDACTONE) 50 MG tablet Take 1 tablet (50 mg) by mouth daily. 90 tablet 3    torsemide (DEMADEX) 10 MG tablet Take 1 tablet (10 mg) by mouth daily. 90 tablet 3    vitamin C (ASCORBIC ACID) 500 MG tablet Take 1,000 mg by mouth daily.      ziprasidone (GEODON) 40 MG capsule Take 40 mg by mouth daily.      No Known Allergies      Lab Results    Chemistry/lipid CBC Cardiac Enzymes/BNP/TSH/INR   Lab Results   Component Value Date    BUN 11.7 2025     2025    CO2 28 2025    Lab Results   Component Value Date    WBC 10.4 2024    HGB 14.8 2024    HCT 43.4 2024    MCV 84 2024     2024    Lab Results   Component Value Date    TSH 2.27 2024            Trista Ford, DNP, APRN, CNP  [facility] and [phone]  Fax: [phone]  Heart Failure Nurses: [phone]

## 2025-04-28 ENCOUNTER — OFFICE VISIT (OUTPATIENT)
Dept: CARDIOLOGY | Facility: CLINIC | Age: 35
End: 2025-04-28
Payer: COMMERCIAL

## 2025-04-28 VITALS
HEIGHT: 74 IN | BODY MASS INDEX: 40.43 KG/M2 | HEART RATE: 80 BPM | WEIGHT: 315 LBS | RESPIRATION RATE: 16 BRPM | SYSTOLIC BLOOD PRESSURE: 156 MMHG | DIASTOLIC BLOOD PRESSURE: 78 MMHG

## 2025-04-28 DIAGNOSIS — I50.32 CHRONIC HEART FAILURE WITH PRESERVED EJECTION FRACTION (HFPEF) (H): Primary | ICD-10-CM

## 2025-04-28 DIAGNOSIS — I10 ESSENTIAL HYPERTENSION: ICD-10-CM

## 2025-04-28 DIAGNOSIS — R73.03 PREDIABETES: ICD-10-CM

## 2025-04-28 DIAGNOSIS — E66.01 MORBID OBESITY (H): ICD-10-CM

## 2025-04-28 DIAGNOSIS — Z87.891 PERSONAL HISTORY OF TOBACCO USE, PRESENTING HAZARDS TO HEALTH: ICD-10-CM

## 2025-04-28 LAB
ANION GAP SERPL CALCULATED.3IONS-SCNC: 10 MMOL/L (ref 7–15)
BUN SERPL-MCNC: 11.7 MG/DL (ref 6–20)
CALCIUM SERPL-MCNC: 9.1 MG/DL (ref 8.8–10.4)
CHLORIDE SERPL-SCNC: 104 MMOL/L (ref 98–107)
CREAT SERPL-MCNC: 1.11 MG/DL (ref 0.67–1.17)
EGFRCR SERPLBLD CKD-EPI 2021: 89 ML/MIN/1.73M2
GLUCOSE SERPL-MCNC: 103 MG/DL (ref 70–99)
HCO3 SERPL-SCNC: 28 MMOL/L (ref 22–29)
POTASSIUM SERPL-SCNC: 3.8 MMOL/L (ref 3.4–5.3)
SODIUM SERPL-SCNC: 142 MMOL/L (ref 135–145)

## 2025-04-28 PROCEDURE — 36415 COLL VENOUS BLD VENIPUNCTURE: CPT | Performed by: NURSE PRACTITIONER

## 2025-04-28 PROCEDURE — 3078F DIAST BP <80 MM HG: CPT | Performed by: NURSE PRACTITIONER

## 2025-04-28 PROCEDURE — 99214 OFFICE O/P EST MOD 30 MIN: CPT | Performed by: NURSE PRACTITIONER

## 2025-04-28 PROCEDURE — 80048 BASIC METABOLIC PNL TOTAL CA: CPT | Performed by: NURSE PRACTITIONER

## 2025-04-28 PROCEDURE — G2211 COMPLEX E/M VISIT ADD ON: HCPCS | Performed by: NURSE PRACTITIONER

## 2025-04-28 PROCEDURE — 3077F SYST BP >= 140 MM HG: CPT | Performed by: NURSE PRACTITIONER

## 2025-04-28 RX ORDER — MULTIVITAMIN/IRON/FOLIC ACID 18MG-0.4MG
1 TABLET ORAL
COMMUNITY
Start: 2025-04-08

## 2025-04-28 NOTE — LETTER
4/28/2025    Rika RUSSELL Skip  [address] [facility] [address]    RE: Jason DUGAN Óscar       Dear Colleague,     I had the pleasure of seeing Jason Cantrell in the [facility].      [facility]  [address], [place] [address]  Office: [phone]     Assessment/Recommendations   Assessment: Mr. Cantrell presents to [facility] today for heart failure focused follow-up visit.    # Chronic diastolic heart failure/HFpEF (EF 55-60% per TTE 12/26/2024)  Stage C. NYHA Class II.  Patient hospitalized 12/26-12/31/2025 for ADHF. Most recent echo showed EF 55-60%, severe concentric left ventricular hypertrophy. Other structures/function not well visualized due to technically difficult study.    Kidney function has remained stable. Based on known date Cr wnl ~2018, Cr ranging ~1.1-1.4 since hospitalization 12/2024 with transient fluctuations in Cr. Monitoring closely, most recent Cr 1.24 (3/2025). Recheck BMP today.    Unable to obtain weigh on clinic scale today as he outweighs scale limits. Home scale readings have been stable for several weeks, this week he weighed 558 lbs on home scale. For reference, discharge from hospital weight on 12/31 was documented at 539 lbs. Upon exam today, patient is well-compensated. No overt fluid retention. Exam is difficult due to body habitus. Since last visit he has been watching sodium and fluid intake much closer. He tells me today that his clothes feel looser in the waistband.     BP: stable/~controlled  Fluid status: difficult exam; Diuretic: torsemide 10 mg, no KCL supp  Aldosterone antagonist: Spironolactone 50 mg  SGLT2i: Jardiance 10 mg  Ischemia evaluation: deferred while other medical therapies are prioritized  NSAID use: contraindicated  -Sleep apnea evaluation: referral to sleep clinic in place    The mainstays of therapy for HFpEF include volume management, blood pressure  control, treating underlying sleep apnea if present, treatment of underlying CAD if within the goals of care, weight management, exercise training, rate control for atrial fibrillation as well as consideration for a rhythm control strategy, and consideration for clinical trials. SGLT2 inhibitors are currently the only medications that have proven evidence with benefit against HFpEF, and have received a class 2a recommendation. Sacubitril/valsartan (Entresto) and spironolactone have also had some evidence as well, receiving class 2b recommendation. Importantly, recommendation for beta blockers have been removed as there is some evidence for harm, thus preferential to avoid if possible. Beta blockers may adversely influence clinical outcomes by limiting chronotropic response in HFpEF.    Heart failure education including medication compliance and lifestyle management reviewed today: low sodium diet <2g Na/day, fluid intake <2L/day, daily weight monitoring, and physical activity as tolerated.     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  -Weight exceeds limits of clinic scale, unable to weigh today, weight at home this week was 558 lbs  -Discharge from hospital weight 12/31/2024 was documented 539 lbs  -Referral to weight management clinic in place, encouraged patient to schedule or to work with PCP to consider medication to help with weight loss  -Encouraged to continue to following heart healthy low sodium diet and exercise     # Prediabetes  -A1C in prediabetes range first documented in 2019. Most recently with improved A1C 5.0 (12/2024)    # Tobacco use  -Has decreased  cigarette use, currently smoking 1-pack q3 weeks  -Counseled continuing to cut back and eventually cessation, further support and resources at his direction      Plan:  BMP today  Monitor BP at home, consider adding additional agent for better management  Continue daily weight monitoring, record on log  Emphasis today on heart healthy low sodium diet, exercise, and weight loss    -Follow-up with general cardiology, Dr. Kern- scheduled 6/18/2025.   -Follow-up in the heart failure clinic with CECY in ~2-months, sooner if needed    The longitudinal plan of care for the condition(s) below were addressed during this visit. Due to the added complexity in care, I will continue to support Mr. Cantrell in the subsequent management of this condition(s) and with the ongoing continuity of care of this condition(s): HFpEF.     History of Present Illness/Subjective    Mr. Cantrell is a 34 year old male with a past medical history significant for HTN, HFpEF, morbid obesity, bipolar 1 disorder. Today patient presents to [facility] for heart failure focused follow-up visit.    Primary Cardiologist: Establish care visit scheduled with Dr. Kern 6/18/2025.    Hospitalization, [facility] 12/26-12/31/2024. Presented with shortness of breath. He was found to have hypertensive emergency and acute decompensated heart failure. Echo showed EF 55-60%, severe concentric left ventricular hypertrophy, other structures/function not well visualized due to technically difficult study. BNP was elevated to 3433. He was treated with IV lasix. With mild PARK, chlorthalidone ordered to replace hydrochlorothiazide. He was not discharged on loop diuretics. Discharge from hospital weight was 539 lbs.     Patient was seen in the heart failure clinic on 1/15/2025 by myself. At that time he denied s/sx of HF. Jardiance was added to med regimen. Again was seen by myself on 3/18/2025, c/o abdominal fullness and torsemide was  "added. Of note he was drinking upwards 4L of fluid per day and thought he could improve upon overall sodium intake. Unable to obtain clinic weights in either of these visits due to weight exceeding scale limits.     Today, patient reports he has been feeling well. He has stable exertional shortness of breath that he experiences when exercising that is his baseline. He has been able to weigh on home scale several times with readings relatively stable over the past few weeks: ~560-562 lbs. He was 558 lbs today. He has cut daily fluid back to ~1.5-2L and is watching the salt in his diet more closely. He has added more vegetables to his diet. He tells me today that his clothes feel looser in the waistband. He continues to exercise at [employer]. Patient is active at his job, works at [employer]. He has ordered a blood pressure cuff but has yet to receive it in the mail.     He denies chest pain, palpitations, lightheadedness/dizziness, presyncope, syncope, shortness of breath at rest, orthopnea, PND, fatigue/activity intolerance, abdominal fullness/bloating, LE edema, and bleeding.      Recent test results & labs below (personally reviewed):    Echocardiogram 12/26/2024  Interpretation Summary  The left ventricle is normal in size.  There is severe concentric left ventricular hypertrophy.  The visual ejection fraction is 55-60%.  Regional wall motion abnormalities cannot be excluded due to limited  visualization.  The left ventricle is not well visualized.  The aortic valve is not well visualized.  No aortic stenosis is present.  The aorta root is normal.  Sinus rhythm was noted.  Technically difficult, suboptimal study. Very difficut to image cardiac  structures.  Recommend MRI or HAVEN to see cardiac structures.     Physical Examination Review of Systems   BP (!) 156/78 (BP Location: Right arm, Patient Position: Sitting, Cuff Size: Adult Large)   Pulse 80   Resp 16   Ht 1.867 m (6' 1.5\")   Wt (!) 253.1 kg (558 lb)   BMI " 72.62 kg/m    Body mass index is 72.62 kg/m .  Wt Readings from Last 3 Encounters:   04/28/25 (!) 253.1 kg (558 lb)   12/31/24 (!) 244.8 kg (539 lb 9.6 oz)   12/05/22 (!) 247.2 kg (545 lb)     General Appearance:   Appears comfortable, in no acute distress   HEENT: Eyes symmetrical, no discharge or icterus bilaterally. Mucous membranes moist and without lesions   Cardiovascular: RRR, +S1S2, no murmur, rub, or gallop. JVP not visible at 90 degrees      Respiratory:   Respirations regular, even, and unlabored. Lungs CTA throughout   GI:  Soft, morbid obese   Musculoskeletal: No joint swelling or tenderness   Extremities   No cyanosis. No peripheral edema.   Skin: Warm, no xanthelasma, no jaundice, no rashes or lesions    Neurologic: Alert and oriented X3, no focal deficits   Psychiatric: calm and cooperative                                             Negative unless noted in HPI     Medical History  Surgical History Family History Social History   No past medical history on file. Past Surgical History:   Procedure Laterality Date     HERNIA REPAIR      Family History   Problem Relation Age of Onset     Diabetes Mother      Diverticulitis Father      Alcoholism Brother     Social History     Socioeconomic History     Marital status: Single     Spouse name: Not on file     Number of children: Not on file     Years of education: Not on file     Highest education level: Not on file   Occupational History     Not on file   Tobacco Use     Smoking status: Every Day     Smokeless tobacco: Never   Substance and Sexual Activity     Alcohol use: Yes     Comment: Alcoholic Drinks/day: on occasion     Drug use: No     Sexual activity: Not on file   Other Topics Concern     Not on file   Social History Narrative    5/3/2016 - Lives in a group home.     Social Drivers of Health     Financial Resource Strain: Low Risk  (12/27/2024)    Financial Resource Strain      Within the past 12 months, have you or your family members you live  with been unable to get utilities (heat, electricity) when it was really needed?: No   Food Insecurity: Low Risk  (12/27/2024)    Food Insecurity      Within the past 12 months, did you worry that your food would run out before you got money to buy more?: No      Within the past 12 months, did the food you bought just not last and you didn t have money to get more?: No   Transportation Needs: Low Risk  (12/27/2024)    Transportation Needs      Within the past 12 months, has lack of transportation kept you from medical appointments, getting your medicines, non-medical meetings or appointments, work, or from getting things that you need?: No   Physical Activity: Not on file   Stress: Not on file   Social Connections: Not on file   Interpersonal Safety: Low Risk  (12/27/2024)    Interpersonal Safety      Do you feel physically and emotionally safe where you currently live?: Yes      Within the past 12 months, have you been hit, slapped, kicked or otherwise physically hurt by someone?: No      Within the past 12 months, have you been humiliated or emotionally abused in other ways by your partner or ex-partner?: No   Housing Stability: Low Risk  (12/27/2024)    Housing Stability      Do you have housing? : Yes      Are you worried about losing your housing?: No        Medications  Allergies   Current Outpatient Medications   Medication Sig Dispense Refill     amLODIPine (NORVASC) 10 MG tablet Take 1 tablet (10 mg) by mouth daily. 90 tablet 1     carvedilol (COREG) 25 MG tablet Take 1 tablet (25 mg) by mouth 2 times daily (with meals). 180 tablet 3     CERTAVITE/ANTIOXIDANTS tablet Take 1 tablet by mouth.       chlorthalidone (HYGROTON) 25 MG tablet Take 1 tablet (25 mg) by mouth daily. 90 tablet 1     cholecalciferol (VITAMIN D3) 125 mcg (5000 units) capsule Take 125 mcg by mouth daily.       DULoxetine (CYMBALTA) 60 MG capsule [DULOXETINE (CYMBALTA) 60 MG CAPSULE] Take 60 mg by mouth daily.       empagliflozin  (JARDIANCE) 10 MG TABS tablet Take 1 tablet (10 mg) by mouth daily. 90 tablet 1     losartan (COZAAR) 100 MG tablet Take 1 tablet (100 mg) by mouth daily. 90 tablet 3     MULTIVIT-MINERALS/FERROUS GLUC (CEROVITE ORAL) Take 1 tablet by mouth daily.       niacin 500 MG tablet [NIACIN 500 MG TABLET] Take 500 mg by mouth daily with breakfast.       omega-3 acid ethyl esters (LOVAZA) 1 gram capsule [OMEGA-3 ACID ETHYL ESTERS (LOVAZA) 1 GRAM CAPSULE] Take 2,000 mg by mouth daily.       spironolactone (ALDACTONE) 50 MG tablet Take 1 tablet (50 mg) by mouth daily. 90 tablet 3     torsemide (DEMADEX) 10 MG tablet Take 1 tablet (10 mg) by mouth daily. 90 tablet 3     vitamin C (ASCORBIC ACID) 500 MG tablet Take 1,000 mg by mouth daily.       ziprasidone (GEODON) 40 MG capsule Take 40 mg by mouth daily.      No Known Allergies      Lab Results    Chemistry/lipid CBC Cardiac Enzymes/BNP/TSH/INR   Lab Results   Component Value Date    BUN 11.7 2025     2025    CO2 28 2025    Lab Results   Component Value Date    WBC 10.4 2024    HGB 14.8 2024    HCT 43.4 2024    MCV 84 2024     2024    Lab Results   Component Value Date    TSH 2.27 2024            Trista Ford, APRYL, APRN, CNP  [facility] - Heart Failure Clinic [place]   Clinic and [phone]  Fax: [phone]  Heart Failure Nurses: [phone]      Thank you for allowing me to participate in the care of your patient.      Sincerely,     RAMON Wang [facility]  cc:   Trista Ford CNP  [address] [place] [address]

## 2025-04-28 NOTE — PATIENT INSTRUCTIONS
It was a pleasure to see you today at the [facility].     Recommendations:  Record blood pressure and daily weights  Continue to follow heart healthy diet and regular exercise  Consider medication to help with weight loss either via primary care doctor or weight management clinic    -Continue to follow a low sodium diet (<2g/day) and maintain adequate fluid intake (~50-60 oz/day).   -Continue to monitor your weight daily and please call if you gain 2 lbs or more in 1 day OR 5 lbs or more in 1-week.    Follow-up with general cardiology, Dr. Kern- scheduled 2025.   Follow-up in the heart failure clinic with CECY in ~2-months, sooner if needed    Please call with questions/concerns and/or if you experience new or worsening heart failure symptoms (shortness of breath, weight gain, fluid retention/lower extremity swelling, and/or reduced activity tolerance).    Heart Failure Nurse Line: [phone] (M-F 8a-430)  24-hour HF Nurse Line: [phone] (weekends, evenings, holidays)    Trista Ford CNP  [facility] - Heart Failure Clinic [facility]  Clinic and [phone]  Fax: [phone]  Heart Failure Nurses: [phone]

## 2025-04-28 NOTE — PROGRESS NOTES
[facility]  [address], [place] [address]  Office: [phone]     Assessment/Recommendations   Assessment: Mr. Cantrell presents to [facility] today for post hospitalization heart failure visit.    # Chronic diastolic heart failure/HFpEF (EF 55-60%)  Stage C. NYHA Class II.  Most recent echo showed EF 55-60%, severe concentric left ventricular hypertrophy. Other structures/function not well visualized due to technically difficult study. Patient hospitalized 12/26-12/31/2025 for ADHF. Mild transient fluctuations in Cr with med changes otherwise, based on known data, kidney function has remained stable. Unable to obtain on clinic scale today as he outweighs scale limits. Home bariatric scale sent home with patient at last visit has produced unreliable readings, scale limits 550 lbs so may be unable to use reliably until he is well below that limit. Discharge from hospital weight on 12/31 was 539 lbs. Upon exam today, patient is well-compensated. No overt fluid retention. Exam is difficult due to body habitus. Suspect that he is retaining fluid given non-compliance with Na and fluid restrictions, increased abdominal fullness, elevated blood pressure, and although unreliable readings on home scale, weights ranging much higher 248- 270 lbs. BMP and NtproBNP today. Low threshold to add loop diuretic.     Patient may be good candidate for CardioMEMS for heart failure monitoring. Note may be ineligible at this time d/t chest circumference. Consider at follow-up. Device was not introduced today.     BP: un-controlled   Fluid status: difficult exam/suspect hypervolemic; Diuretic: none, no KCL supp  Aldosterone antagonist: Spironolactone 50 mg  SGLT2i: Jardiance 10 mg  Ischemia evaluation: deferred while other medical therapies are prioritized  NSAID use: contraindicated  -Sleep apnea evaluation: referral to sleep clinic in place- needs to be  Extubated at 12:12 per MD order. Breath sounds clear no stridor audible leak.   scheduled    The mainstays of therapy for HFpEF include volume management, blood pressure control, treating underlying sleep apnea if present, treatment of underlying CAD if within the goals of care, weight management, exercise training, rate control for atrial fibrillation as well as consideration for a rhythm control strategy, and consideration for clinical trials. SGLT2 inhibitors are currently the only medications that have proven evidence with benefit against HFpEF, and have received a class 2a recommendation. Sacubitril/valsartan (Entresto) and spironolactone have also had some evidence as well, receiving class 2b recommendation. Importantly, recommendation for beta blockers have been removed as there is some evidence for harm, thus preferential to avoid if possible. Beta blockers may adversely influence clinical outcomes by limiting chronotropic response in HFpEF.    Heart failure education including medication compliance and lifestyle management reviewed today: low sodium diet <2g Na/day, fluid intake <2L/day, daily weight monitoring, and physical activity as tolerated.     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  -Has been told that O2 saturations drop when sleeping and that he snores  -Sleep study ordered at previous visit, needs to be scheduled     # Obesity  -Weight exceeds limits of clinic scale, unable to weigh today  -Discharge from hospital weight 12/31/2024 was 539 lbs  -Weighed at [facility] mid January 2025 529.5 lbs  -Patient was issued home bariatric scale at last office visit, unreliable readings may be d/t equipment error (scale limit 550 lbs), patient reporting home readings 548-570 lbs and scale does not consistently work  -Estimated BMI ~70-75  -Referral to weight management clinic placed today    # Tobacco use  -Discussed cutting back and eventually cessation, further  support and resources at his direction      Plan:  -BMP and NtproBNP today, anticipate adding loop diuretic  -Monitor BP at home, consider adding additional agent for better management  -Reduce Na intake, aim for <2g/day and reduce H20 intake, aim for <2L/day  -Referral to weight loss clinic  -Continue to attempt and log daily weights at home    Follow up in the Heart Failure Clinic with CECY in ~1-month.   Establish care with general cardiology- outpatient, referral placed today ~3-months    The longitudinal plan of care for the condition(s) below were addressed during this visit. Due to the added complexity in care, I will continue to support Mr. Cantrell in the subsequent management of this condition(s) and with the ongoing continuity of care of this condition(s): HFpEF.     History of Present Illness/Subjective    Mr. Cantrell is a 34 year old male with a past medical history significant for HTN, HFpEF, morbid obesity, bipolar 1 disorder. Today patient presents to [facility] for post hospitalization heart failure visit.    Hospitalization, [facility] 12/26-12/31/2024. Presented with shortness of breath. He was found to have hypertensive emergency and acute decompensated heart failure. Echo showed EF 55-60%, severe concentric left ventricular hypertrophy, other structures/function not well visualized due to technically difficult study. BNP was elevated to 3433. He was treated with IV lasix. With mild PARK, chlorthalidone ordered to replace hydrochlorothiazide. He was not discharged on loop diuretics. Discharge from hospital weight was 539 lbs.     Patient was last seen in the heart failure clinic on 1/15/2025 by myself. At that time he denied s/sx of HF. Jardiance was added to med regimen.     Today, patient reports he is feeling well. He denies shortness of breath at rest. He does think he may be retaining fluid given increased abdominal fullness. He has stable exertional  shortness of breath that he experiences when exercising that is his baseline. He has been unable to record consistent reliable weights at home likely d/t weight exceeding scale limits.     Patient began membership at the [employer] and is working with .     He has been trying to watch sodium in his diet, thinks he could improve in this area. Mother helps with grocery shopping. He is drinking upwards 4L of fluid per day.     He denies chest pain, palpitations, lightheadedness/dizziness, shortness of breath, DESHPANDE, orthopnea, PND, fatigue/activity intolerance, and LE edema.      Recent test results & labs below (personally reviewed):    Echocardiogram 12/26/2024  Interpretation Summary  The left ventricle is normal in size.  There is severe concentric left ventricular hypertrophy.  The visual ejection fraction is 55-60%.  Regional wall motion abnormalities cannot be excluded due to limited  visualization.  The left ventricle is not well visualized.  The aortic valve is not well visualized.  No aortic stenosis is present.  The aorta root is normal.  Sinus rhythm was noted.  Technically difficult, suboptimal study. Very difficut to image cardiac  structures.  Recommend MRI or HAVEN to see cardiac structures.     Physical Examination Review of Systems   BP (!) 146/64   Pulse 80   Resp 14   There is no height or weight on file to calculate BMI.  Wt Readings from Last 3 Encounters:   12/31/24 (!) 244.8 kg (539 lb 9.6 oz)   12/05/22 (!) 247.2 kg (545 lb)   05/03/18 (!) 247.2 kg (545 lb)     General Appearance:   Appears comfortable, in no acute distress   HEENT: Eyes symmetrical, no discharge or icterus bilaterally. Mucous membranes moist and without lesions   Cardiovascular: RRR, +S1S2, no murmur, rub, or gallop. JVP not visible at 90 degrees      Respiratory:   Respirations regular, even, and unlabored. Lungs CTA throughout   GI:  Soft, morbid obese   Musculoskeletal: No joint swelling or tenderness    Extremities   No cyanosis. No peripheral edema.   Skin: Warm, no xanthelasma, no jaundice, no rashes or lesions    Neurologic: Alert and oriented X3, no focal deficits   Psychiatric: calm and cooperative                                             Negative unless noted in HPI     Medical History  Surgical History Family History Social History   No past medical history on file. Past Surgical History:   Procedure Laterality Date    HERNIA REPAIR      Family History   Problem Relation Age of Onset    Diabetes Mother     Diverticulitis Father     Alcoholism Brother     Social History     Socioeconomic History    Marital status: Single     Spouse name: Not on file    Number of children: Not on file    Years of education: Not on file    Highest education level: Not on file   Occupational History    Not on file   Tobacco Use    Smoking status: Every Day    Smokeless tobacco: Never   Substance and Sexual Activity    Alcohol use: Yes     Comment: Alcoholic Drinks/day: on occasion    Drug use: No    Sexual activity: Not on file   Other Topics Concern    Not on file   Social History Narrative    5/3/2016 - Lives in a group home.     Social Drivers of Health     Financial Resource Strain: Low Risk  (12/27/2024)    Financial Resource Strain     Within the past 12 months, have you or your family members you live with been unable to get utilities (heat, electricity) when it was really needed?: No   Food Insecurity: Low Risk  (12/27/2024)    Food Insecurity     Within the past 12 months, did you worry that your food would run out before you got money to buy more?: No     Within the past 12 months, did the food you bought just not last and you didn t have money to get more?: No   Transportation Needs: Low Risk  (12/27/2024)    Transportation Needs     Within the past 12 months, has lack of transportation kept you from medical appointments, getting your medicines, non-medical meetings or appointments, work, or from getting things  that you need?: No   Physical Activity: Not on file   Stress: Not on file   Social Connections: Not on file   Interpersonal Safety: Low Risk  (12/27/2024)    Interpersonal Safety     Do you feel physically and emotionally safe where you currently live?: Yes     Within the past 12 months, have you been hit, slapped, kicked or otherwise physically hurt by someone?: No     Within the past 12 months, have you been humiliated or emotionally abused in other ways by your partner or ex-partner?: No   Housing Stability: Low Risk  (12/27/2024)    Housing Stability     Do you have housing? : Yes     Are you worried about losing your housing?: No        Medications  Allergies   Current Outpatient Medications   Medication Sig Dispense Refill    amLODIPine (NORVASC) 10 MG tablet Take 1 tablet (10 mg) by mouth daily. 30 tablet 0    carvedilol (COREG) 25 MG tablet Take 1 tablet (25 mg) by mouth 2 times daily (with meals). 60 tablet 0    chlorthalidone (HYGROTON) 25 MG tablet Take 1 tablet (25 mg) by mouth daily. 30 tablet 0    cholecalciferol (VITAMIN D3) 125 mcg (5000 units) capsule Take 125 mcg by mouth daily.      cholecalciferol, vitamin D3, (VITAMIN D3) 2,000 unit cap Take 2,000 Units by mouth daily.      DULoxetine (CYMBALTA) 60 MG capsule [DULOXETINE (CYMBALTA) 60 MG CAPSULE] Take 60 mg by mouth daily.      empagliflozin (JARDIANCE) 10 MG TABS tablet Take 1 tablet (10 mg) by mouth daily. 90 tablet 1    losartan (COZAAR) 100 MG tablet Take 1 tablet (100 mg) by mouth daily. 30 tablet 0    MULTIVIT-MINERALS/FERROUS GLUC (CEROVITE ORAL) Take 1 tablet by mouth daily.      niacin 500 MG tablet [NIACIN 500 MG TABLET] Take 500 mg by mouth daily with breakfast.      omega-3 acid ethyl esters (LOVAZA) 1 gram capsule [OMEGA-3 ACID ETHYL ESTERS (LOVAZA) 1 GRAM CAPSULE] Take 2,000 mg by mouth daily.      spironolactone (ALDACTONE) 50 MG tablet Take 1 tablet (50 mg) by mouth daily. 30 tablet 0    vitamin C (ASCORBIC ACID) 500 MG tablet  Take 1,000 mg by mouth daily.      ziprasidone (GEODON) 40 MG capsule Take 40 mg by mouth daily.      No Known Allergies      Lab Results    Chemistry/lipid CBC Cardiac Enzymes/BNP/TSH/INR   Lab Results   Component Value Date    BUN 15.3 2025     2025    CO2 25 2025    Lab Results   Component Value Date    WBC 10.4 2024    HGB 14.8 2024    HCT 43.4 2024    MCV 84 2024     2024    Lab Results   Component Value Date    TSH 2.27 2024            Trista Ford, DNP, APRN, CNP  [facility] [place]   Clinic and [phone]  Fax: [phone]  Heart Failure Nurses: [phone]

## 2025-05-23 ENCOUNTER — OFFICE VISIT (OUTPATIENT)
Dept: CARDIOLOGY | Facility: CLINIC | Age: 35
End: 2025-05-23
Payer: COMMERCIAL

## 2025-05-23 VITALS
WEIGHT: 315 LBS | HEART RATE: 74 BPM | SYSTOLIC BLOOD PRESSURE: 136 MMHG | BODY MASS INDEX: 71.06 KG/M2 | DIASTOLIC BLOOD PRESSURE: 81 MMHG | RESPIRATION RATE: 14 BRPM

## 2025-05-23 DIAGNOSIS — I50.32 CHRONIC HEART FAILURE WITH PRESERVED EJECTION FRACTION (HFPEF) (H): Primary | ICD-10-CM

## 2025-05-23 DIAGNOSIS — I10 ESSENTIAL HYPERTENSION: ICD-10-CM

## 2025-05-23 DIAGNOSIS — E66.01 MORBID OBESITY (H): ICD-10-CM

## 2025-05-23 DIAGNOSIS — Z87.891 PERSONAL HISTORY OF TOBACCO USE, PRESENTING HAZARDS TO HEALTH: ICD-10-CM

## 2025-05-23 DIAGNOSIS — R73.03 PREDIABETES: ICD-10-CM

## 2025-05-23 PROCEDURE — 3079F DIAST BP 80-89 MM HG: CPT | Performed by: NURSE PRACTITIONER

## 2025-05-23 PROCEDURE — G2211 COMPLEX E/M VISIT ADD ON: HCPCS | Performed by: NURSE PRACTITIONER

## 2025-05-23 PROCEDURE — 3075F SYST BP GE 130 - 139MM HG: CPT | Performed by: NURSE PRACTITIONER

## 2025-05-23 PROCEDURE — 99214 OFFICE O/P EST MOD 30 MIN: CPT | Performed by: NURSE PRACTITIONER

## 2025-05-23 NOTE — PROGRESS NOTES
[facility]  [address], [place] [address]  Office: [phone]     Assessment/Recommendations   Assessment: Mr. Cantrell presents to [facility] today for heart failure focused follow-up visit.    # Chronic diastolic heart failure/HFpEF (EF 55-60% per TTE 12/26/2024)   Stage C. NYHA Class II.  Patient hospitalized 12/26-12/31/2025 for ADHF. Most recent echo showed EF 55-60%, severe concentric left ventricular hypertrophy. Regional WMA cannot be excluded due to limited visualization. Other structures/function not well visualized due to technically difficult study. Consider MRI or HAVEN to further evaluate cardiac structures.     Kidney function has remained relatively stable. Based on known data Cr wnl ~2018, Cr ranging ~1.1-1.4 since hospitalization 12/2024 with transient fluctuations in Cr. Monitoring closely, most recent Cr normalized, 1.11 4/28/2025.    Weight on the clinic scale today was 546 lbs. Most recent home weight was 541 lbs (patient weighing weekly). Upon exam today, patient is well-compensated. No overt fluid retention. Exam is difficult due to body habitus.    BP: controlled, room for further optimization (goal SBP <130)  Fluid status: difficult exam; Diuretic: torsemide 10 mg, no KCL supp  Aldosterone antagonist: Spironolactone 50 mg  SGLT2i: Jardiance 10 mg  Ischemia evaluation: deferred while other medical therapies are prioritized  NSAID use: contraindicated  -Sleep apnea evaluation: referral to sleep clinic in place, not scheduled     The mainstays of therapy for HFpEF include volume management, blood pressure control, treating underlying sleep apnea if present, treatment of underlying CAD if within the goals of care, weight management, exercise training, rate control for atrial fibrillation as well as consideration for a rhythm control strategy, and consideration for clinical trials. SGLT2 inhibitors are currently the  only medications that have proven evidence with benefit against HFpEF, and have received a class 2a recommendation. Sacubitril/valsartan (Entresto) and spironolactone have also had some evidence as well, receiving class 2b recommendation. Importantly, recommendation for beta blockers have been removed as there is some evidence for harm, thus preferential to avoid if possible. Beta blockers may adversely influence clinical outcomes by limiting chronotropic response in HFpEF.    Heart failure education including medication compliance and lifestyle management reviewed today: low sodium diet <2g Na/day, fluid intake <2L/day, daily weight monitoring, and physical activity as tolerated.     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  -Patient able to weigh on the clinic scale today, at previous visits weight exceeded scale limits  -Patient is interested in GLP-1, referral to weight management clinic in place, encouraged patient to schedule or to work with PCP to consider medication to help with weight loss, could also place MTM consult if patient is interested in this instead   -Encouraged to continue to following heart healthy low sodium diet and exercise     # Prediabetes  -A1C in prediabetes range first documented in 2019. Most recently with improved A1C 5.0 (12/2024)    # Tobacco use  -Has decreased cigarette use, currently smoking 1-pack q3 weeks  -Counseled continuing to cut back and eventually cessation, further support and resources at his  direction      Plan:  No changes today. Continue to work on diet, exercise, weight loss. Continue to monitor blood pressure and daily weights. Continue to work on smoking cessation.   Talk to your primary care doctor about weight loss medications. You still have the option to meet with the weight management clinic or the specialty pharmacist to help with this.     -Follow-up with general cardiology, Dr. Kern- scheduled 6/18/2025.   -Follow-up in the heart failure clinic with [facility] in ~3-months, sooner if needed    The longitudinal plan of care for the condition(s) below were addressed during this visit. Due to the added complexity in care, I will continue to support Mr. Cantrell in the subsequent management of this condition(s) and with the ongoing continuity of care of this condition(s): HFpEF.     History of Present Illness/Subjective    Mr. Cantrell is a 34 year old male with a past medical history significant for HTN, HFpEF, morbid obesity, bipolar 1 disorder. Today patient presents to [facility] for heart failure focused follow-up visit.    Primary Cardiologist: Establish care visit scheduled with Dr. Kern 6/18/2025.    Today, patient reports he has been feeling well. He has stable exertional shortness of breath that he experiences that is at his baseline. His weight on home scale most recently was 541 lbs (weighing once weekly). He continues to exercise at [facility]. Patient is active at his job, works at [employer]. He has started counting calories per day. He is not closely following a low sodium diet but has successfully restricted servings per meal.     He denies chest pain, palpitations, lightheadedness/dizziness, presyncope, syncope, shortness of breath at rest, orthopnea, PND, fatigue/activity intolerance, abdominal fullness/bloating, LE edema, and bleeding.      Recent test results & labs below (personally reviewed):    Echocardiogram 12/26/2024  Interpretation Summary  The left  ventricle is normal in size.  There is severe concentric left ventricular hypertrophy.  The visual ejection fraction is 55-60%.  Regional wall motion abnormalities cannot be excluded due to limited  visualization.  The left ventricle is not well visualized.  The aortic valve is not well visualized.  No aortic stenosis is present.  The aorta root is normal.  Sinus rhythm was noted.  Technically difficult, suboptimal study. Very difficut to image cardiac  structures.  Recommend MRI or HAVEN to see cardiac structures.     Physical Examination Review of Systems   /81 (BP Location: Right arm, Patient Position: Sitting, Cuff Size: Adult Large)   Pulse 74   Resp 14   Wt (!) 247.7 kg (546 lb)   BMI 71.06 kg/m    Body mass index is 71.06 kg/m .  Wt Readings from Last 3 Encounters:   05/23/25 (!) 247.7 kg (546 lb)   04/28/25 (!) 253.1 kg (558 lb)   12/31/24 (!) 244.8 kg (539 lb 9.6 oz)     General Appearance:   Appears comfortable, in no acute distress   HEENT: Eyes symmetrical, no discharge or icterus bilaterally. Mucous membranes moist and without lesions   Cardiovascular: RRR, +S1S2, no murmur, rub, or gallop. JVP not visible at 90 degrees      Respiratory:   Respirations regular, even, and unlabored. Lungs CTA throughout   GI:  Soft, morbid obese   Musculoskeletal: No joint swelling or tenderness   Extremities   No cyanosis. No peripheral edema.   Skin: Warm, no xanthelasma, no jaundice, no rashes or lesions    Neurologic: Alert and oriented X3, no focal deficits   Psychiatric: calm and cooperative                                             Negative unless noted in HPI     Medical History  Surgical History Family History Social History   No past medical history on file. Past Surgical History:   Procedure Laterality Date    HERNIA REPAIR      Family History   Problem Relation Age of Onset    Diabetes Mother     Diverticulitis Father     Alcoholism Brother     Social History     Socioeconomic History    Marital  status: Single     Spouse name: Not on file    Number of children: Not on file    Years of education: Not on file    Highest education level: Not on file   Occupational History    Not on file   Tobacco Use    Smoking status: Every Day    Smokeless tobacco: Never   Substance and Sexual Activity    Alcohol use: Yes     Comment: Alcoholic Drinks/day: on occasion    Drug use: No    Sexual activity: Not on file   Other Topics Concern    Not on file   Social History Narrative    5/3/2016 - Lives in a group home.     Social Drivers of Health     Financial Resource Strain: Low Risk  (12/27/2024)    Financial Resource Strain     Within the past 12 months, have you or your family members you live with been unable to get utilities (heat, electricity) when it was really needed?: No   Food Insecurity: Low Risk  (12/27/2024)    Food Insecurity     Within the past 12 months, did you worry that your food would run out before you got money to buy more?: No     Within the past 12 months, did the food you bought just not last and you didn t have money to get more?: No   Transportation Needs: Low Risk  (12/27/2024)    Transportation Needs     Within the past 12 months, has lack of transportation kept you from medical appointments, getting your medicines, non-medical meetings or appointments, work, or from getting things that you need?: No   Physical Activity: Not on file   Stress: Not on file   Social Connections: Not on file   Interpersonal Safety: Low Risk  (12/27/2024)    Interpersonal Safety     Do you feel physically and emotionally safe where you currently live?: Yes     Within the past 12 months, have you been hit, slapped, kicked or otherwise physically hurt by someone?: No     Within the past 12 months, have you been humiliated or emotionally abused in other ways by your partner or ex-partner?: No   Housing Stability: Low Risk  (12/27/2024)    Housing Stability     Do you have housing? : Yes     Are you worried about losing  your housing?: No        Medications  Allergies   Current Outpatient Medications   Medication Sig Dispense Refill    amLODIPine (NORVASC) 10 MG tablet Take 1 tablet (10 mg) by mouth daily. 90 tablet 1    carvedilol (COREG) 25 MG tablet Take 1 tablet (25 mg) by mouth 2 times daily (with meals). 180 tablet 3    CERTAVITE/ANTIOXIDANTS tablet Take 1 tablet by mouth.      chlorthalidone (HYGROTON) 25 MG tablet Take 1 tablet (25 mg) by mouth daily. 90 tablet 1    cholecalciferol (VITAMIN D3) 125 mcg (5000 units) capsule Take 125 mcg by mouth daily.      DULoxetine (CYMBALTA) 60 MG capsule [DULOXETINE (CYMBALTA) 60 MG CAPSULE] Take 60 mg by mouth daily.      empagliflozin (JARDIANCE) 10 MG TABS tablet Take 1 tablet (10 mg) by mouth daily. 90 tablet 1    losartan (COZAAR) 100 MG tablet Take 1 tablet (100 mg) by mouth daily. 90 tablet 3    MULTIVIT-MINERALS/FERROUS GLUC (CEROVITE ORAL) Take 1 tablet by mouth daily.      niacin 500 MG tablet [NIACIN 500 MG TABLET] Take 500 mg by mouth daily with breakfast.      omega-3 acid ethyl esters (LOVAZA) 1 gram capsule [OMEGA-3 ACID ETHYL ESTERS (LOVAZA) 1 GRAM CAPSULE] Take 2,000 mg by mouth daily.      spironolactone (ALDACTONE) 50 MG tablet Take 1 tablet (50 mg) by mouth daily. 90 tablet 3    torsemide (DEMADEX) 10 MG tablet Take 1 tablet (10 mg) by mouth daily. 90 tablet 3    vitamin C (ASCORBIC ACID) 500 MG tablet Take 1,000 mg by mouth daily.      ziprasidone (GEODON) 40 MG capsule Take 40 mg by mouth daily.      No Known Allergies      Lab Results    Chemistry/lipid CBC Cardiac Enzymes/BNP/TSH/INR   Lab Results   Component Value Date    BUN 11.7 04/28/2025     04/28/2025    CO2 28 04/28/2025    Lab Results   Component Value Date    WBC 10.4 12/29/2024    HGB 14.8 12/29/2024    HCT 43.4 12/29/2024    MCV 84 12/29/2024     12/29/2024    Lab Results   Component Value Date    TSH 2.27 12/27/2024              Trista Ford, DNP, APRN, CNP  [facility]  [facility] - Heart Failure Clinic [place]   Clinic and [phone]  Fax: [phone]  Heart Failure Nurses: [phone]

## 2025-05-23 NOTE — PATIENT INSTRUCTIONS
It was a pleasure to see you today at the [facility].     Recommendations:  No changes today. Continue to work on diet, exercise, weight loss. Continue to monitor blood pressure and daily weights. Continue to work on smoking cessation.   Talk to your primary care doctor about weight loss medications. You still have the option to meet with the weight management clinic or the specialty pharmacist to help with this.     -Continue to follow a low sodium diet (<2g/day) and maintain adequate fluid intake (~50-60 oz/day).   -Continue to monitor your weight daily and please call if you gain 2 lbs or more in 1 day OR 5 lbs or more in 1-week.    Follow-up with general cardiology, Dr. Kern- scheduled 2025.  Follow-up in the heart failure clinic with [facility] in ~3-months.    Please call with questions/concerns and/or if you experience new or worsening heart failure symptoms (shortness of breath, weight gain, fluid retention/lower extremity swelling, and/or reduced activity tolerance).    Heart Failure Nurse Line: [phone] ([id number])  24-hour HF Nurse Line: [phone] (weekends, evenings, holidays)    Trista Ford CNP  [facility] - [facility]  Clinic and [phone]  Fax: [phone]  Heart Failure Nurses: [phone]

## 2025-05-23 NOTE — LETTER
5/23/2025    Rika Valdivia  [address] [facility] [address]    RE: Jason DUGAN Óscar       Dear Colleague,     I had the pleasure of seeing Jason Cantrell in the [facility].      [facility]  [address], [place] [address]  Office: [phone]     Assessment/Recommendations   Assessment: Mr. Cantrell presents to [facility] today for heart failure focused follow-up visit.    # Chronic diastolic heart failure/HFpEF (EF 55-60% per TTE 12/26/2024)   Stage C. NYHA Class II.  Patient hospitalized 12/26-12/31/2025 for ADHF. Most recent echo showed EF 55-60%, severe concentric left ventricular hypertrophy. Regional WMA cannot be excluded due to limited visualization. Other structures/function not well visualized due to technically difficult study. Consider MRI or HAVEN to further evaluate cardiac structures.     Kidney function has remained relatively stable. Based on known data Cr wnl ~2018, Cr ranging ~1.1-1.4 since hospitalization 12/2024 with transient fluctuations in Cr. Monitoring closely, most recent Cr normalized, 1.11 4/28/2025.    Weight on the clinic scale today was 546 lbs. Most recent home weight was 541 lbs (patient weighing weekly). Upon exam today, patient is well-compensated. No overt fluid retention. Exam is difficult due to body habitus.    BP: controlled, room for further optimization (goal SBP <130)  Fluid status: difficult exam; Diuretic: torsemide 10 mg, no KCL supp  Aldosterone antagonist: Spironolactone 50 mg  SGLT2i: Jardiance 10 mg  Ischemia evaluation: deferred while other medical therapies are prioritized  NSAID use: contraindicated  -Sleep apnea evaluation: referral to sleep clinic in place, not scheduled     The mainstays of therapy for HFpEF include volume management, blood pressure control, treating underlying sleep apnea if present, treatment of underlying CAD if within the goals of  care, weight management, exercise training, rate control for atrial fibrillation as well as consideration for a rhythm control strategy, and consideration for clinical trials. SGLT2 inhibitors are currently the only medications that have proven evidence with benefit against HFpEF, and have received a class 2a recommendation. Sacubitril/valsartan (Entresto) and spironolactone have also had some evidence as well, receiving class 2b recommendation. Importantly, recommendation for beta blockers have been removed as there is some evidence for harm, thus preferential to avoid if possible. Beta blockers may adversely influence clinical outcomes by limiting chronotropic response in HFpEF.    Heart failure education including medication compliance and lifestyle management reviewed today: low sodium diet <2g Na/day, fluid intake <2L/day, daily weight monitoring, and physical activity as tolerated.     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  -Patient able to weigh on the clinic scale today, at previous visits weight exceeded scale limits  -Patient is interested in GLP-1, referral to weight management clinic in place, encouraged patient to schedule or to work with PCP to consider medication to help with weight loss, could also place MTM consult if patient is interested in this instead   -Encouraged to continue to following heart healthy low sodium diet and exercise     # Prediabetes  -A1C in prediabetes range first documented in 2019. Most recently with improved  A1C 5.0 (12/2024)    # Tobacco use  -Has decreased cigarette use, currently smoking 1-pack q3 weeks  -Counseled continuing to cut back and eventually cessation, further support and resources at his direction      Plan:  No changes today. Continue to work on diet, exercise, weight loss. Continue to monitor blood pressure and daily weights. Continue to work on smoking cessation.   Talk to your primary care doctor about weight loss medications. You still have the option to meet with the weight management clinic or the specialty pharmacist to help with this.     -Follow-up with general cardiology, Dr. Kern- scheduled 6/18/2025.   -Follow-up in the heart failure clinic with CECY in ~3-months, sooner if needed    The longitudinal plan of care for the condition(s) below were addressed during this visit. Due to the added complexity in care, I will continue to support Mr. Cantrell in the subsequent management of this condition(s) and with the ongoing continuity of care of this condition(s): HFpEF.     History of Present Illness/Subjective    Mr. Cantrell is a 34 year old male with a past medical history significant for HTN, HFpEF, morbid obesity, bipolar 1 disorder. Today patient presents to [facility] for heart failure focused follow-up visit.    Primary Cardiologist: Establish care visit scheduled with Dr. Kern 6/18/2025.    Today, patient reports he has been feeling well. He has stable exertional shortness of breath that he experiences that is at his baseline. His weight on home scale most recently was 541 lbs (weighing once weekly). He continues to exercise at [facility]. Patient is active at his job, works at [employer]. He has started counting calories per day. He is not closely following a low sodium diet but has successfully restricted servings per meal.     He denies chest pain, palpitations, lightheadedness/dizziness, presyncope, syncope, shortness of breath at rest, orthopnea, PND,  fatigue/activity intolerance, abdominal fullness/bloating, LE edema, and bleeding.      Recent test results & labs below (personally reviewed):    Echocardiogram 12/26/2024  Interpretation Summary  The left ventricle is normal in size.  There is severe concentric left ventricular hypertrophy.  The visual ejection fraction is 55-60%.  Regional wall motion abnormalities cannot be excluded due to limited  visualization.  The left ventricle is not well visualized.  The aortic valve is not well visualized.  No aortic stenosis is present.  The aorta root is normal.  Sinus rhythm was noted.  Technically difficult, suboptimal study. Very difficut to image cardiac  structures.  Recommend MRI or HAVEN to see cardiac structures.     Physical Examination Review of Systems   /81 (BP Location: Right arm, Patient Position: Sitting, Cuff Size: Adult Large)   Pulse 74   Resp 14   Wt (!) 247.7 kg (546 lb)   BMI 71.06 kg/m    Body mass index is 71.06 kg/m .  Wt Readings from Last 3 Encounters:   05/23/25 (!) 247.7 kg (546 lb)   04/28/25 (!) 253.1 kg (558 lb)   12/31/24 (!) 244.8 kg (539 lb 9.6 oz)     General Appearance:   Appears comfortable, in no acute distress   HEENT: Eyes symmetrical, no discharge or icterus bilaterally. Mucous membranes moist and without lesions   Cardiovascular: RRR, +S1S2, no murmur, rub, or gallop. JVP not visible at 90 degrees      Respiratory:   Respirations regular, even, and unlabored. Lungs CTA throughout   GI:  Soft, morbid obese   Musculoskeletal: No joint swelling or tenderness   Extremities   No cyanosis. No peripheral edema.   Skin: Warm, no xanthelasma, no jaundice, no rashes or lesions    Neurologic: Alert and oriented X3, no focal deficits   Psychiatric: calm and cooperative                                             Negative unless noted in HPI     Medical History  Surgical History Family History Social History   No past medical history on file. Past Surgical History:   Procedure  Laterality Date     HERNIA REPAIR      Family History   Problem Relation Age of Onset     Diabetes Mother      Diverticulitis Father      Alcoholism Brother     Social History     Socioeconomic History     Marital status: Single     Spouse name: Not on file     Number of children: Not on file     Years of education: Not on file     Highest education level: Not on file   Occupational History     Not on file   Tobacco Use     Smoking status: Every Day     Smokeless tobacco: Never   Substance and Sexual Activity     Alcohol use: Yes     Comment: Alcoholic Drinks/day: on occasion     Drug use: No     Sexual activity: Not on file   Other Topics Concern     Not on file   Social History Narrative    5/3/2016 - Lives in a group home.     Social Drivers of Health     Financial Resource Strain: Low Risk  (12/27/2024)    Financial Resource Strain      Within the past 12 months, have you or your family members you live with been unable to get utilities (heat, electricity) when it was really needed?: No   Food Insecurity: Low Risk  (12/27/2024)    Food Insecurity      Within the past 12 months, did you worry that your food would run out before you got money to buy more?: No      Within the past 12 months, did the food you bought just not last and you didn t have money to get more?: No   Transportation Needs: Low Risk  (12/27/2024)    Transportation Needs      Within the past 12 months, has lack of transportation kept you from medical appointments, getting your medicines, non-medical meetings or appointments, work, or from getting things that you need?: No   Physical Activity: Not on file   Stress: Not on file   Social Connections: Not on file   Interpersonal Safety: Low Risk  (12/27/2024)    Interpersonal Safety      Do you feel physically and emotionally safe where you currently live?: Yes      Within the past 12 months, have you been hit, slapped, kicked or otherwise physically hurt by someone?: No      Within the past 12  months, have you been humiliated or emotionally abused in other ways by your partner or ex-partner?: No   Housing Stability: Low Risk  (12/27/2024)    Housing Stability      Do you have housing? : Yes      Are you worried about losing your housing?: No        Medications  Allergies   Current Outpatient Medications   Medication Sig Dispense Refill     amLODIPine (NORVASC) 10 MG tablet Take 1 tablet (10 mg) by mouth daily. 90 tablet 1     carvedilol (COREG) 25 MG tablet Take 1 tablet (25 mg) by mouth 2 times daily (with meals). 180 tablet 3     CERTAVITE/ANTIOXIDANTS tablet Take 1 tablet by mouth.       chlorthalidone (HYGROTON) 25 MG tablet Take 1 tablet (25 mg) by mouth daily. 90 tablet 1     cholecalciferol (VITAMIN D3) 125 mcg (5000 units) capsule Take 125 mcg by mouth daily.       DULoxetine (CYMBALTA) 60 MG capsule [DULOXETINE (CYMBALTA) 60 MG CAPSULE] Take 60 mg by mouth daily.       empagliflozin (JARDIANCE) 10 MG TABS tablet Take 1 tablet (10 mg) by mouth daily. 90 tablet 1     losartan (COZAAR) 100 MG tablet Take 1 tablet (100 mg) by mouth daily. 90 tablet 3     MULTIVIT-MINERALS/FERROUS GLUC (CEROVITE ORAL) Take 1 tablet by mouth daily.       niacin 500 MG tablet [NIACIN 500 MG TABLET] Take 500 mg by mouth daily with breakfast.       omega-3 acid ethyl esters (LOVAZA) 1 gram capsule [OMEGA-3 ACID ETHYL ESTERS (LOVAZA) 1 GRAM CAPSULE] Take 2,000 mg by mouth daily.       spironolactone (ALDACTONE) 50 MG tablet Take 1 tablet (50 mg) by mouth daily. 90 tablet 3     torsemide (DEMADEX) 10 MG tablet Take 1 tablet (10 mg) by mouth daily. 90 tablet 3     vitamin C (ASCORBIC ACID) 500 MG tablet Take 1,000 mg by mouth daily.       ziprasidone (GEODON) 40 MG capsule Take 40 mg by mouth daily.      No Known Allergies      Lab Results    Chemistry/lipid CBC Cardiac Enzymes/BNP/TSH/INR   Lab Results   Component Value Date    BUN 11.7 04/28/2025     04/28/2025    CO2 28 04/28/2025    Lab Results   Component Value  Date    WBC 10.4 2024    HGB 14.8 2024    HCT 43.4 2024    MCV 84 2024     2024    Lab Results   Component Value Date    TSH 2.27 2024              Trista Ford, APRYL, APRN, CNP  [facility] [place]   Clinic and [phone]  Fax: [phone]  Heart Failure Nurses: [phone]    Thank you for allowing me to participate in the care of your patient.      Sincerely,     Trista Ford CNP     [facility]  cc:   Trista Ford CNP  [address] [place] [address]

## 2025-06-13 NOTE — PROGRESS NOTES
HEART CARE FOLLOW UP NOTE      [facility]  [phone]      Assessment/Recommendations   Assessment: 34 year old male with HFpEF     Plan:  HFpEF  Reviewed diagnosis, treatment, causes (DANIKA, obesity, HTN), well compensated today.  TTE images subopitmal, MRI would not be possible given body habitus.  Could consider HAVEN but would require general anesthesia.  Since he is feeling well and EF normal by TTE will hold off for now       -Currently Torsemide 10mg daily, weight 509 (per patient home scale), dry unknown      -Consider CardioMems as body habitus makes clinical assessment of volume status difficult       -Advised evaluation for DANIKA      -Diet, exercise, weight loss, congratulated him on his efforts so far he has lost 31 pounds       -Tobacco cessation      -Continue Spironolactone 50mg, Jardiance 10mg       -Check BMP today, he is using K+ supplements and is on high dose Spironolactone so want to check for hyperkalemia       -Follow-up With Trista as planned 8/28/2025, I can see him back for next visit after that, timing TBD by Trista at that visit      2.  HTN  Looks great today       -Continue Coreg 25mg BID, hydrochlorothiazide 25mg, Norvasc 10mg, Losartan 100mg, Spironolactone 50mg       -Diet, exercise, weight loss     3.  Hypertriglyceridemia  Trig 287 at last check      -Continue Lovaza 1g daily     The longitudinal plan of care for the diagnosis(es)/condition(s) as documented were addressed during this visit. Due to the added complexity in care, I will continue to support Jason in the subsequent management and with ongoing continuity of care.          History of Present Illness/Subjective    Indication for visit:  Jason Cantrell returns for follow up of HFpEF, HTN and was last seen on 12/26/2024 by myself as an in-patient.      HPI: Jason Cantrell is a 34 year old male with a history of morbid obesity (weight 536), bipolar, untreated HTN, tobacco who was admitted  "12/26/2024 for dyspnea.  ECG no acute changes, hs troponin 46, 41, BNP 3433, CXR CHF.  An echocardiogram showed grossly normal EF, but due to body habitus image quality was poor.       He has been treated for HTN, advised sleep study, weight loss.    He returns for follow up and reports he has been exercising nearly every day and has lost 30 pounds, switched to diet soda.      I reviewed notes from PCP prior to this visit.         Physical Examination  Past Cardiac History   Vitals: /60 (BP Location: Right arm, Patient Position: Sitting, Cuff Size: Adult Large)   Pulse 60   Resp 16   Ht 1.854 m (6' 1\")   BMI 72.04 kg/m    BMI= Body mass index is 72.04 kg/m .  Wt Readings from Last 3 Encounters:   05/23/25 (!) 247.7 kg (546 lb)   04/28/25 (!) 253.1 kg (558 lb)   12/31/24 (!) 244.8 kg (539 lb 9.6 oz)       General Appearance:   no distress, normal body habitus   ENT/Mouth: membranes moist, no oral lesions or bleeding gums.      EYES:  no scleral icterus, normal conjunctivae   Neck: no carotid bruits or thyromegaly   Chest/Lungs:   lungs are clear to auscultation, no rales or wheezing,  sternal scar, equal chest wall expansion    Cardiovascular:   Regular. Normal first and second heart sounds with no murmurs, rubs, or gallops; the carotid, radial and posterior tibial pulses are intact, Jugular venous pressure normal, no edema bilaterally    Abdomen:  no organomegaly, masses, bruits, or tenderness; bowel sounds are present   Extremities: no cyanosis or clubbing   Skin: no xanthelasma, warm.    Neurologic: normal  bilateral, no tremors           HFpEF: dry weight unknown     Most Recent Echocardiogram: 12/26/2024  The left ventricle is normal in size.  There is severe concentric left ventricular hypertrophy.  The visual ejection fraction is 55-60%.  Regional wall motion abnormalities cannot be excluded due to limited visualization.  The left ventricle is not well visualized.  The aortic valve is not well " visualized.  No aortic stenosis is present.  The aorta root is normal.  Sinus rhythm was noted.  Technically difficult, suboptimal study. Very difficut to image cardiac structures.  Recommend MRI or HAVEN to see cardiac structures.    Most Recent Stress Test: None     Most Recent Angiogram: None     ECG (reviewed by myself): 12/26/2024 NSR 86 bpm           Medical History  Family History Social History   HFpEF  HTN  Hyperlipidemia   Family History   Problem Relation Age of Onset    Diabetes Mother     Diverticulitis Father     Alcoholism Brother         Social History     Socioeconomic History    Marital status: Single     Spouse name: Not on file    Number of children: Not on file    Years of education: Not on file    Highest education level: Not on file   Occupational History    Not on file   Tobacco Use    Smoking status: Every Day    Smokeless tobacco: Never   Substance and Sexual Activity    Alcohol use: Yes     Comment: Alcoholic Drinks/day: on occasion    Drug use: No    Sexual activity: Not on file   Other Topics Concern    Not on file   Social History Narrative    5/3/2016 - Lives in a group home.     Social Drivers of Health     Financial Resource Strain: Low Risk  (12/27/2024)    Financial Resource Strain     Within the past 12 months, have you or your family members you live with been unable to get utilities (heat, electricity) when it was really needed?: No   Food Insecurity: Low Risk  (12/27/2024)    Food Insecurity     Within the past 12 months, did you worry that your food would run out before you got money to buy more?: No     Within the past 12 months, did the food you bought just not last and you didn t have money to get more?: No   Transportation Needs: Low Risk  (12/27/2024)    Transportation Needs     Within the past 12 months, has lack of transportation kept you from medical appointments, getting your medicines, non-medical meetings or appointments, work, or from getting things that you need?:  No   Physical Activity: Not on file   Stress: Not on file   Social Connections: Not on file   Interpersonal Safety: Low Risk  (12/27/2024)    Interpersonal Safety     Do you feel physically and emotionally safe where you currently live?: Yes     Within the past 12 months, have you been hit, slapped, kicked or otherwise physically hurt by someone?: No     Within the past 12 months, have you been humiliated or emotionally abused in other ways by your partner or ex-partner?: No   Housing Stability: Low Risk  (12/27/2024)    Housing Stability     Do you have housing? : Yes     Are you worried about losing your housing?: No           Medications  Allergies   Current Outpatient Medications   Medication Sig Dispense Refill    amLODIPine (NORVASC) 10 MG tablet Take 1 tablet (10 mg) by mouth daily. 90 tablet 1    carvedilol (COREG) 25 MG tablet Take 1 tablet (25 mg) by mouth 2 times daily (with meals). 180 tablet 3    CERTAVITE/ANTIOXIDANTS tablet Take 1 tablet by mouth.      chlorthalidone (HYGROTON) 25 MG tablet Take 1 tablet (25 mg) by mouth daily. 90 tablet 1    cholecalciferol (VITAMIN D3) 125 mcg (5000 units) capsule Take 125 mcg by mouth daily.      DULoxetine (CYMBALTA) 60 MG capsule [DULOXETINE (CYMBALTA) 60 MG CAPSULE] Take 60 mg by mouth daily.      empagliflozin (JARDIANCE) 10 MG TABS tablet Take 1 tablet (10 mg) by mouth daily. 90 tablet 1    losartan (COZAAR) 100 MG tablet Take 1 tablet (100 mg) by mouth daily. 90 tablet 3    MULTIVIT-MINERALS/FERROUS GLUC (CEROVITE ORAL) Take 1 tablet by mouth daily.      niacin 500 MG tablet [NIACIN 500 MG TABLET] Take 500 mg by mouth daily with breakfast.      omega-3 acid ethyl esters (LOVAZA) 1 gram capsule [OMEGA-3 ACID ETHYL ESTERS (LOVAZA) 1 GRAM CAPSULE] Take 2,000 mg by mouth daily.      spironolactone (ALDACTONE) 50 MG tablet Take 1 tablet (50 mg) by mouth daily. 90 tablet 3    torsemide (DEMADEX) 10 MG tablet Take 1 tablet (10 mg) by mouth daily. 90 tablet 3     "vitamin C (ASCORBIC ACID) 500 MG tablet Take 1,000 mg by mouth daily.      ziprasidone (GEODON) 40 MG capsule Take 40 mg by mouth daily.       No Known Allergies       Lab Results    Chemistry/lipid CBC Cardiac Enzymes/BNP/TSH/INR   No results for input(s): \"CHOL\", \"HDL\", \"LDL\", \"TRIG\", \"CHOLHDLRATIO\" in the last 29638 hours.  No results for input(s): \"LDL\" in the last 33161 hours.  Recent Labs   Lab Test 04/28/25  1718      POTASSIUM 3.8   CHLORIDE 104   CO2 28   *   BUN 11.7   CR 1.11   GFRESTIMATED 89   DANIEL 9.1     Recent Labs   Lab Test 04/28/25  1718 03/18/25  1614 01/28/25  1343   CR 1.11 1.24* 1.44*     Recent Labs   Lab Test 12/27/24  0449   A1C 5.0          Recent Labs   Lab Test 12/29/24  0440   WBC 10.4   HGB 14.8   HCT 43.4   MCV 84        Recent Labs   Lab Test 12/29/24  0440 12/28/24  0425 12/27/24  0449   HGB 14.8 15.2 14.3    No results for input(s): \"TROPONINI\" in the last 35245 hours.  Recent Labs   Lab Test 03/18/25  1614 01/15/25  1209 12/26/24  0413   NTBNPI  --   --  3,433*   NTBNP 91 91  --      Recent Labs   Lab Test 12/27/24  0449   TSH 2.27     No results for input(s): \"INR\" in the last 92294 hours.     Ivelisse Kern MD  Noninvasive Cardiologist   [facility]                                    "

## 2025-06-18 ENCOUNTER — RESULTS FOLLOW-UP (OUTPATIENT)
Dept: CARDIOLOGY | Facility: CLINIC | Age: 35
End: 2025-06-18

## 2025-06-18 ENCOUNTER — OFFICE VISIT (OUTPATIENT)
Dept: CARDIOLOGY | Facility: CLINIC | Age: 35
End: 2025-06-18
Payer: COMMERCIAL

## 2025-06-18 VITALS
HEIGHT: 73 IN | HEART RATE: 60 BPM | RESPIRATION RATE: 16 BRPM | BODY MASS INDEX: 72.04 KG/M2 | SYSTOLIC BLOOD PRESSURE: 128 MMHG | DIASTOLIC BLOOD PRESSURE: 60 MMHG

## 2025-06-18 DIAGNOSIS — E78.2 MIXED HYPERLIPIDEMIA: ICD-10-CM

## 2025-06-18 DIAGNOSIS — I10 PRIMARY HYPERTENSION: ICD-10-CM

## 2025-06-18 DIAGNOSIS — I50.32 CHRONIC HEART FAILURE WITH PRESERVED EJECTION FRACTION (HFPEF) (H): Primary | ICD-10-CM

## 2025-06-18 LAB
ANION GAP SERPL CALCULATED.3IONS-SCNC: 10 MMOL/L (ref 7–15)
BUN SERPL-MCNC: 27.4 MG/DL (ref 6–20)
CALCIUM SERPL-MCNC: 9.3 MG/DL (ref 8.8–10.4)
CHLORIDE SERPL-SCNC: 101 MMOL/L (ref 98–107)
CREAT SERPL-MCNC: 1.54 MG/DL (ref 0.67–1.17)
EGFRCR SERPLBLD CKD-EPI 2021: 60 ML/MIN/1.73M2
GLUCOSE SERPL-MCNC: 109 MG/DL (ref 70–99)
HCO3 SERPL-SCNC: 27 MMOL/L (ref 22–29)
POTASSIUM SERPL-SCNC: 4.1 MMOL/L (ref 3.4–5.3)
SODIUM SERPL-SCNC: 138 MMOL/L (ref 135–145)

## 2025-06-18 PROCEDURE — 99214 OFFICE O/P EST MOD 30 MIN: CPT | Performed by: INTERNAL MEDICINE

## 2025-06-18 PROCEDURE — 3074F SYST BP LT 130 MM HG: CPT | Performed by: INTERNAL MEDICINE

## 2025-06-18 PROCEDURE — G2211 COMPLEX E/M VISIT ADD ON: HCPCS | Performed by: INTERNAL MEDICINE

## 2025-06-18 PROCEDURE — 3078F DIAST BP <80 MM HG: CPT | Performed by: INTERNAL MEDICINE

## 2025-06-18 PROCEDURE — 80048 BASIC METABOLIC PNL TOTAL CA: CPT | Performed by: INTERNAL MEDICINE

## 2025-06-18 PROCEDURE — 36415 COLL VENOUS BLD VENIPUNCTURE: CPT | Performed by: INTERNAL MEDICINE

## 2025-06-18 NOTE — PATIENT INSTRUCTIONS
Follow-up with Trista 8/29/2025 at 2:50pm      No need for additional testing at this time     BP today 128/60 :)   noted

## 2025-06-18 NOTE — LETTER
6/18/2025    Rika ROMEROSreekanth Valdivia  [address] [address]    RE: Jason Cantrell       Dear Colleague,     I had the pleasure of seeing Jason Cantrell in the [facility].    HEART CARE FOLLOW UP NOTE      [facility]  [phone]      Assessment/Recommendations   Assessment: 34 year old male with HFpEF     Plan:  HFpEF  Reviewed diagnosis, treatment, causes (DANIKA, obesity, HTN), well compensated today.  TTE images subopitmal, MRI would not be possible given body habitus.  Could consider HAVEN but would require general anesthesia.  Since he is feeling well and EF normal by TTE will hold off for now       -Currently Torsemide 10mg daily, weight 509 (per patient home scale), dry unknown      -Consider CardioMems as body habitus makes clinical assessment of volume status difficult       -Advised evaluation for DANIKA      -Diet, exercise, weight loss, congratulated him on his efforts so far he has lost 31 pounds       -Tobacco cessation      -Continue Spironolactone 50mg, Jardiance 10mg       -Check BMP today, he is using K+ supplements and is on high dose Spironolactone so want to check for hyperkalemia       -Follow-up With Trista as planned 8/28/2025, I can see him back for next visit after that, timing TBD by Trista at that visit      2.  HTN  Looks great today       -Continue Coreg 25mg BID, hydrochlorothiazide 25mg, Norvasc 10mg, Losartan 100mg, Spironolactone 50mg       -Diet, exercise, weight loss     3.  Hypertriglyceridemia  Trig 287 at last check      -Continue Lovaza 1g daily     The longitudinal plan of care for the diagnosis(es)/condition(s) as documented were addressed during this visit. Due to the added complexity in care, I will continue to support Jason in the subsequent management and with ongoing continuity of care.          History of Present Illness/Subjective    Indication for visit:  Jason Cantrell returns for follow up of HFpEF, HTN and  "was last seen on 12/26/2024 by myself as an in-patient.      HPI: Jason Cantrell is a 34 year old male with a history of morbid obesity (weight 536), bipolar, untreated HTN, tobacco who was admitted 12/26/2024 for dyspnea.  ECG no acute changes, hs troponin 46, 41, BNP 3433, CXR CHF.  An echocardiogram showed grossly normal EF, but due to body habitus image quality was poor.       He has been treated for HTN, advised sleep study, weight loss.    He returns for follow up and reports he has been exercising nearly every day and has lost 30 pounds, switched to diet soda.      I reviewed notes from PCP prior to this visit.         Physical Examination  Past Cardiac History   Vitals: /60 (BP Location: Right arm, Patient Position: Sitting, Cuff Size: Adult Large)   Pulse 60   Resp 16   Ht 1.854 m (6' 1\")   BMI 72.04 kg/m    BMI= Body mass index is 72.04 kg/m .  Wt Readings from Last 3 Encounters:   05/23/25 (!) 247.7 kg (546 lb)   04/28/25 (!) 253.1 kg (558 lb)   12/31/24 (!) 244.8 kg (539 lb 9.6 oz)       General Appearance:   no distress, normal body habitus   ENT/Mouth: membranes moist, no oral lesions or bleeding gums.      EYES:  no scleral icterus, normal conjunctivae   Neck: no carotid bruits or thyromegaly   Chest/Lungs:   lungs are clear to auscultation, no rales or wheezing,  sternal scar, equal chest wall expansion    Cardiovascular:   Regular. Normal first and second heart sounds with no murmurs, rubs, or gallops; the carotid, radial and posterior tibial pulses are intact, Jugular venous pressure normal, no edema bilaterally    Abdomen:  no organomegaly, masses, bruits, or tenderness; bowel sounds are present   Extremities: no cyanosis or clubbing   Skin: no xanthelasma, warm.    Neurologic: normal  bilateral, no tremors           HFpEF: dry weight unknown     Most Recent Echocardiogram: 12/26/2024  The left ventricle is normal in size.  There is severe concentric left ventricular " hypertrophy.  The visual ejection fraction is 55-60%.  Regional wall motion abnormalities cannot be excluded due to limited visualization.  The left ventricle is not well visualized.  The aortic valve is not well visualized.  No aortic stenosis is present.  The aorta root is normal.  Sinus rhythm was noted.  Technically difficult, suboptimal study. Very difficut to image cardiac structures.  Recommend MRI or HAVEN to see cardiac structures.    Most Recent Stress Test: None     Most Recent Angiogram: None     ECG (reviewed by myself): 12/26/2024 NSR 86 bpm           Medical History  Family History Social History   HFpEF  HTN  Hyperlipidemia   Family History   Problem Relation Age of Onset     Diabetes Mother      Diverticulitis Father      Alcoholism Brother         Social History     Socioeconomic History     Marital status: Single     Spouse name: Not on file     Number of children: Not on file     Years of education: Not on file     Highest education level: Not on file   Occupational History     Not on file   Tobacco Use     Smoking status: Every Day     Smokeless tobacco: Never   Substance and Sexual Activity     Alcohol use: Yes     Comment: Alcoholic Drinks/day: on occasion     Drug use: No     Sexual activity: Not on file   Other Topics Concern     Not on file   Social History Narrative    5/3/2016 - Lives in a group home.     Social Drivers of Health     Financial Resource Strain: Low Risk  (12/27/2024)    Financial Resource Strain      Within the past 12 months, have you or your family members you live with been unable to get utilities (heat, electricity) when it was really needed?: No   Food Insecurity: Low Risk  (12/27/2024)    Food Insecurity      Within the past 12 months, did you worry that your food would run out before you got money to buy more?: No      Within the past 12 months, did the food you bought just not last and you didn t have money to get more?: No   Transportation Needs: Low Risk   (12/27/2024)    Transportation Needs      Within the past 12 months, has lack of transportation kept you from medical appointments, getting your medicines, non-medical meetings or appointments, work, or from getting things that you need?: No   Physical Activity: Not on file   Stress: Not on file   Social Connections: Not on file   Interpersonal Safety: Low Risk  (12/27/2024)    Interpersonal Safety      Do you feel physically and emotionally safe where you currently live?: Yes      Within the past 12 months, have you been hit, slapped, kicked or otherwise physically hurt by someone?: No      Within the past 12 months, have you been humiliated or emotionally abused in other ways by your partner or ex-partner?: No   Housing Stability: Low Risk  (12/27/2024)    Housing Stability      Do you have housing? : Yes      Are you worried about losing your housing?: No           Medications  Allergies   Current Outpatient Medications   Medication Sig Dispense Refill     amLODIPine (NORVASC) 10 MG tablet Take 1 tablet (10 mg) by mouth daily. 90 tablet 1     carvedilol (COREG) 25 MG tablet Take 1 tablet (25 mg) by mouth 2 times daily (with meals). 180 tablet 3     CERTAVITE/ANTIOXIDANTS tablet Take 1 tablet by mouth.       chlorthalidone (HYGROTON) 25 MG tablet Take 1 tablet (25 mg) by mouth daily. 90 tablet 1     cholecalciferol (VITAMIN D3) 125 mcg (5000 units) capsule Take 125 mcg by mouth daily.       DULoxetine (CYMBALTA) 60 MG capsule [DULOXETINE (CYMBALTA) 60 MG CAPSULE] Take 60 mg by mouth daily.       empagliflozin (JARDIANCE) 10 MG TABS tablet Take 1 tablet (10 mg) by mouth daily. 90 tablet 1     losartan (COZAAR) 100 MG tablet Take 1 tablet (100 mg) by mouth daily. 90 tablet 3     MULTIVIT-MINERALS/FERROUS GLUC (CEROVITE ORAL) Take 1 tablet by mouth daily.       niacin 500 MG tablet [NIACIN 500 MG TABLET] Take 500 mg by mouth daily with breakfast.       omega-3 acid ethyl esters (LOVAZA) 1 gram capsule [OMEGA-3 ACID  "ETHYL ESTERS (LOVAZA) 1 GRAM CAPSULE] Take 2,000 mg by mouth daily.       spironolactone (ALDACTONE) 50 MG tablet Take 1 tablet (50 mg) by mouth daily. 90 tablet 3     torsemide (DEMADEX) 10 MG tablet Take 1 tablet (10 mg) by mouth daily. 90 tablet 3     vitamin C (ASCORBIC ACID) 500 MG tablet Take 1,000 mg by mouth daily.       ziprasidone (GEODON) 40 MG capsule Take 40 mg by mouth daily.       No Known Allergies       Lab Results    Chemistry/lipid CBC Cardiac Enzymes/BNP/TSH/INR   No results for input(s): \"CHOL\", \"HDL\", \"LDL\", \"TRIG\", \"CHOLHDLRATIO\" in the last 96495 hours.  No results for input(s): \"LDL\" in the last 29544 hours.  Recent Labs   Lab Test 04/28/25  1718      POTASSIUM 3.8   CHLORIDE 104   CO2 28   *   BUN 11.7   CR 1.11   GFRESTIMATED 89   DANIEL 9.1     Recent Labs   Lab Test 04/28/25  1718 03/18/25  1614 01/28/25  1343   CR 1.11 1.24* 1.44*     Recent Labs   Lab Test 12/27/24  0449   A1C 5.0          Recent Labs   Lab Test 12/29/24  0440   WBC 10.4   HGB 14.8   HCT 43.4   MCV 84        Recent Labs   Lab Test 12/29/24  0440 12/28/24  0425 12/27/24  0449   HGB 14.8 15.2 14.3    No results for input(s): \"TROPONINI\" in the last 41673 hours.  Recent Labs   Lab Test 03/18/25  1614 01/15/25  1209 12/26/24  0413   NTBNPI  --   --  3,433*   NTBNP 91 91  --      Recent Labs   Lab Test 12/27/24  0449   TSH 2.27     No results for input(s): \"INR\" in the last 88323 hours.     Ivelisse Kern MD  Noninvasive Cardiologist   [facility]                                      Thank you for allowing me to participate in the care of your patient.      Sincerely,     vIelisse Kern MD     [facility]  cc:   Trista Ford, CNP  [address] [place] [address]      "